# Patient Record
Sex: FEMALE | NOT HISPANIC OR LATINO | URBAN - NONMETROPOLITAN AREA
[De-identification: names, ages, dates, MRNs, and addresses within clinical notes are randomized per-mention and may not be internally consistent; named-entity substitution may affect disease eponyms.]

---

## 2023-10-06 ENCOUNTER — NURSING HOME VISIT (OUTPATIENT)
Dept: PRIMARY CARE | Facility: CLINIC | Age: 83
End: 2023-10-06
Payer: MEDICARE

## 2023-10-06 DIAGNOSIS — I10 PRIMARY HYPERTENSION: ICD-10-CM

## 2023-10-06 DIAGNOSIS — I25.10 CORONARY ARTERY DISEASE INVOLVING NATIVE CORONARY ARTERY OF NATIVE HEART WITHOUT ANGINA PECTORIS: Primary | ICD-10-CM

## 2023-10-06 DIAGNOSIS — K21.9 GASTROESOPHAGEAL REFLUX DISEASE WITHOUT ESOPHAGITIS: ICD-10-CM

## 2023-10-06 DIAGNOSIS — D69.6 THROMBOCYTOPENIA (CMS-HCC): ICD-10-CM

## 2023-10-06 PROCEDURE — 99308 SBSQ NF CARE LOW MDM 20: CPT | Performed by: FAMILY MEDICINE

## 2023-10-09 PROBLEM — F20.9 SCHIZOPHRENIA (MULTI): Status: ACTIVE | Noted: 2023-10-09

## 2023-10-09 PROBLEM — I10 HYPERTENSION: Status: ACTIVE | Noted: 2023-10-09

## 2023-10-09 PROBLEM — D69.6 THROMBOCYTOPENIA (CMS-HCC): Status: ACTIVE | Noted: 2023-10-09

## 2023-10-09 PROBLEM — I25.10 CORONARY ARTERY DISEASE: Status: ACTIVE | Noted: 2023-10-09

## 2023-10-09 PROBLEM — D72.829 LEUKOCYTOSIS: Status: ACTIVE | Noted: 2023-10-09

## 2023-10-09 PROBLEM — F03.90 DEMENTIA (MULTI): Status: ACTIVE | Noted: 2023-10-09

## 2023-10-09 PROBLEM — K21.9 GASTROESOPHAGEAL REFLUX DISEASE: Status: ACTIVE | Noted: 2023-10-09

## 2023-10-09 PROBLEM — E55.9 VITAMIN D DEFICIENCY: Status: ACTIVE | Noted: 2023-10-09

## 2023-10-09 PROBLEM — R13.10 DYSPHAGIA: Status: ACTIVE | Noted: 2023-10-09

## 2023-10-09 PROBLEM — K59.00 CONSTIPATION: Status: ACTIVE | Noted: 2023-10-09

## 2023-10-09 PROBLEM — L12.0 BULLOUS PEMPHIGOID (MULTI): Status: ACTIVE | Noted: 2023-10-09

## 2023-10-09 NOTE — PROGRESS NOTES
Subjective   Patient ID: Yamel Zacarias is a 83 y.o. female who is long term resident being seen and evaluated for multiple medical problems.    HPI see ltc note at facility     Review of Systems    Objective   There were no vitals taken for this visit.    Physical Exam    Assessment/Plan   Problem List Items Addressed This Visit             ICD-10-CM    Coronary artery disease - Primary I25.10    Gastroesophageal reflux disease K21.9    Hypertension I10    Thrombocytopenia (CMS/[facility]) D69.6        Goals    None

## 2023-11-10 ENCOUNTER — NURSING HOME VISIT (OUTPATIENT)
Dept: PRIMARY CARE | Facility: CLINIC | Age: 83
End: 2023-11-10
Payer: MEDICARE

## 2023-11-10 DIAGNOSIS — K21.9 GASTROESOPHAGEAL REFLUX DISEASE WITHOUT ESOPHAGITIS: ICD-10-CM

## 2023-11-10 DIAGNOSIS — I25.10 CORONARY ARTERY DISEASE INVOLVING NATIVE CORONARY ARTERY OF NATIVE HEART WITHOUT ANGINA PECTORIS: Primary | ICD-10-CM

## 2023-11-10 DIAGNOSIS — F03.B0 MODERATE DEMENTIA WITHOUT BEHAVIORAL DISTURBANCE, PSYCHOTIC DISTURBANCE, MOOD DISTURBANCE, OR ANXIETY, UNSPECIFIED DEMENTIA TYPE (MULTI): ICD-10-CM

## 2023-11-10 DIAGNOSIS — D69.6 THROMBOCYTOPENIA (CMS-HCC): ICD-10-CM

## 2023-11-10 DIAGNOSIS — D72.825 BANDEMIA: ICD-10-CM

## 2023-11-10 DIAGNOSIS — I10 PRIMARY HYPERTENSION: ICD-10-CM

## 2023-11-10 DIAGNOSIS — F20.9 SCHIZOPHRENIA, UNSPECIFIED TYPE (MULTI): ICD-10-CM

## 2023-11-10 PROCEDURE — 99309 SBSQ NF CARE MODERATE MDM 30: CPT | Performed by: FAMILY MEDICINE

## 2023-11-13 NOTE — PROGRESS NOTES
Subjective   Patient ID: Yamel Zacarias is a 83 y.o. female who presents for long-term care facility follow-up  HPI     Review of Systems    Objective   There were no vitals taken for this visit.    Physical Exam    Assessment/Plan   Problem List Items Addressed This Visit             ICD-10-CM    Coronary artery disease - Primary I25.10    Dementia (CMS/[facility]) F03.90    Gastroesophageal reflux disease K21.9    Hypertension I10    Leukocytosis D72.829    Schizophrenia (CMS/[facility]) F20.9    Thrombocytopenia (CMS/[facility]) D69.6        This note is for billing purposes only, please see notation at facility for comprehensive review.  Laboratory studies are ordered for next week.

## 2023-12-15 ENCOUNTER — NURSING HOME VISIT (OUTPATIENT)
Dept: POST ACUTE CARE | Facility: EXTERNAL LOCATION | Age: 83
End: 2023-12-15
Payer: COMMERCIAL

## 2023-12-15 DIAGNOSIS — D72.825 BANDEMIA: ICD-10-CM

## 2023-12-15 DIAGNOSIS — F03.B0 MODERATE DEMENTIA WITHOUT BEHAVIORAL DISTURBANCE, PSYCHOTIC DISTURBANCE, MOOD DISTURBANCE, OR ANXIETY, UNSPECIFIED DEMENTIA TYPE (MULTI): ICD-10-CM

## 2023-12-15 DIAGNOSIS — F20.9 SCHIZOPHRENIA, UNSPECIFIED TYPE (MULTI): ICD-10-CM

## 2023-12-15 DIAGNOSIS — I25.10 CORONARY ARTERY DISEASE INVOLVING NATIVE CORONARY ARTERY OF NATIVE HEART WITHOUT ANGINA PECTORIS: Primary | ICD-10-CM

## 2023-12-15 DIAGNOSIS — I10 PRIMARY HYPERTENSION: ICD-10-CM

## 2023-12-15 DIAGNOSIS — K59.01 SLOW TRANSIT CONSTIPATION: ICD-10-CM

## 2023-12-15 PROCEDURE — 99308 SBSQ NF CARE LOW MDM 20: CPT | Performed by: FAMILY MEDICINE

## 2023-12-15 NOTE — LETTER
Patient: Yamel Zacarias  : 1940    Encounter Date: 12/15/2023    Subjective  Patient ID: Yamel Zacarias is a 83 y.o. female who is long term resident being seen and evaluated for multiple medical problems.    HPI     Review of Systems    Objective  There were no vitals taken for this visit.    Physical Exam    Assessment/Plan  Problem List Items Addressed This Visit             ICD-10-CM    Constipation K59.00    Coronary artery disease - Primary I25.10    Dementia (CMS/[facility]) F03.90    Hypertension I10    Leukocytosis D72.829    Schizophrenia (CMS/[facility]) F20.9     Note is for billing purposes only please see complete documentation at [facility].   Goals    None           Electronically Signed By: Hyun Drake MD   23  2:13 PM

## 2023-12-18 NOTE — PROGRESS NOTES
Subjective   Patient ID: Yamel Zacarias is a 83 y.o. female who is long term resident being seen and evaluated for multiple medical problems.    HPI     Review of Systems    Objective   There were no vitals taken for this visit.    Physical Exam    Assessment/Plan   Problem List Items Addressed This Visit             ICD-10-CM    Constipation K59.00    Coronary artery disease - Primary I25.10    Dementia (CMS/[facility]) F03.90    Hypertension I10    Leukocytosis D72.829    Schizophrenia (CMS/[facility]) F20.9     Note is for billing purposes only please see complete documentation at [facility].   Goals    None

## 2024-01-12 ENCOUNTER — NURSING HOME VISIT (OUTPATIENT)
Dept: POST ACUTE CARE | Facility: EXTERNAL LOCATION | Age: 84
End: 2024-01-12
Payer: COMMERCIAL

## 2024-01-12 DIAGNOSIS — I25.10 CORONARY ARTERY DISEASE INVOLVING NATIVE CORONARY ARTERY OF NATIVE HEART WITHOUT ANGINA PECTORIS: Primary | ICD-10-CM

## 2024-01-12 DIAGNOSIS — D72.825 BANDEMIA: ICD-10-CM

## 2024-01-12 DIAGNOSIS — L12.0 BULLOUS PEMPHIGOID (MULTI): ICD-10-CM

## 2024-01-12 DIAGNOSIS — F03.B0 MODERATE DEMENTIA WITHOUT BEHAVIORAL DISTURBANCE, PSYCHOTIC DISTURBANCE, MOOD DISTURBANCE, OR ANXIETY, UNSPECIFIED DEMENTIA TYPE (MULTI): ICD-10-CM

## 2024-01-12 PROCEDURE — 99308 SBSQ NF CARE LOW MDM 20: CPT | Performed by: FAMILY MEDICINE

## 2024-01-12 NOTE — LETTER
Patient: Yamel Zacarias  : 1940    Encounter Date: 2024    Subjective  Patient ID: Yamel Zacarias is a 83 y.o. female who is long term resident being seen and evaluated for multiple medical problems.    HPI     Review of Systems    Objective  There were no vitals taken for this visit.    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  Please see facility documentation for complete note.   Goals    None           Electronically Signed By: Hyun Drake MD   1/15/24 10:13 AM

## 2024-01-15 NOTE — PROGRESS NOTES
Subjective   Patient ID: Yamel Zacarias is a 83 y.o. female who is long term resident being seen and evaluated for multiple medical problems.    HPI     Review of Systems    Objective   There were no vitals taken for this visit.    Physical Exam    Assessment/Plan   Problem List Items Addressed This Visit             ICD-10-CM    Bullous pemphigoid L12.0    Coronary artery disease - Primary I25.10    Dementia (CMS/[facility]) F03.90    Leukocytosis D72.829     This note is for billing purposes only.  Please see facility documentation for complete note.   Goals    None

## 2024-02-09 ENCOUNTER — NURSING HOME VISIT (OUTPATIENT)
Dept: POST ACUTE CARE | Facility: EXTERNAL LOCATION | Age: 84
End: 2024-02-09
Payer: COMMERCIAL

## 2024-02-09 DIAGNOSIS — K59.01 SLOW TRANSIT CONSTIPATION: Primary | ICD-10-CM

## 2024-02-09 DIAGNOSIS — D69.6 THROMBOCYTOPENIA (CMS-HCC): ICD-10-CM

## 2024-02-09 DIAGNOSIS — F20.9 SCHIZOPHRENIA, UNSPECIFIED TYPE (MULTI): ICD-10-CM

## 2024-02-09 PROCEDURE — 99308 SBSQ NF CARE LOW MDM 20: CPT | Performed by: FAMILY MEDICINE

## 2024-02-09 NOTE — LETTER
Patient: Yamel Zacarias  : 1940    Encounter Date: 2024    Subjective  Patient ID: Yamel Zacarias is a 83 y.o. female who is long term resident being seen and evaluated for multiple medical problems.    HPI     Review of Systems    Objective  There were no vitals taken for this visit.    Physical Exam    Assessment/Plan  Problem List Items Addressed This Visit             ICD-10-CM    Constipation - Primary K59.00    Schizophrenia (CMS/[facility]) F20.9    Thrombocytopenia (CMS/[facility]) D69.6   This note is for billing purposes only. For complete documentation, please see note at facility.      Goals    None           Electronically Signed By: Hyun Drake MD   24  1:59 PM

## 2024-02-12 NOTE — PROGRESS NOTES
Subjective   Patient ID: Yamel Zacarias is a 83 y.o. female who is long term resident being seen and evaluated for multiple medical problems.    HPI     Review of Systems    Objective   There were no vitals taken for this visit.    Physical Exam    Assessment/Plan   Problem List Items Addressed This Visit             ICD-10-CM    Constipation - Primary K59.00    Schizophrenia (CMS/[facility]) F20.9    Thrombocytopenia (CMS/[facility]) D69.6   This note is for billing purposes only. For complete documentation, please see note at facility.      Goals    None

## 2024-03-08 ENCOUNTER — NURSING HOME VISIT (OUTPATIENT)
Dept: POST ACUTE CARE | Facility: EXTERNAL LOCATION | Age: 84
End: 2024-03-08
Payer: MEDICARE

## 2024-03-08 DIAGNOSIS — F03.B0 MODERATE DEMENTIA WITHOUT BEHAVIORAL DISTURBANCE, PSYCHOTIC DISTURBANCE, MOOD DISTURBANCE, OR ANXIETY, UNSPECIFIED DEMENTIA TYPE (MULTI): ICD-10-CM

## 2024-03-08 DIAGNOSIS — I25.10 CORONARY ARTERY DISEASE INVOLVING NATIVE CORONARY ARTERY OF NATIVE HEART WITHOUT ANGINA PECTORIS: ICD-10-CM

## 2024-03-08 DIAGNOSIS — I10 PRIMARY HYPERTENSION: Primary | ICD-10-CM

## 2024-03-08 PROCEDURE — 99308 SBSQ NF CARE LOW MDM 20: CPT | Performed by: FAMILY MEDICINE

## 2024-03-08 NOTE — LETTER
Patient: Yamel Zacarias  : 1940    Encounter Date: 2024    Subjective  Patient ID: Yamel Zacarias is a 83 y.o. female who is long term resident being seen and evaluated for multiple medical problems.    HPI     Review of Systems    Objective  There were no vitals taken for this visit.    Physical Exam    Assessment/Plan  Problem List Items Addressed This Visit             ICD-10-CM    Coronary artery disease I25.10    Dementia (CMS/[facility]) F03.90    Hypertension - Primary I10     This note is being done for billing purposes only.  For complete documentation please see note at the facility.   Goals    None           Electronically Signed By: Hyun Drake MD   3/11/24  1:37 PM

## 2024-03-11 PROBLEM — F20.9 SCHIZOPHRENIA (MULTI): Status: RESOLVED | Noted: 2023-10-09 | Resolved: 2024-03-11

## 2024-03-11 NOTE — PROGRESS NOTES
Subjective   Patient ID: Yamel Zacarias is a 83 y.o. female who is long term resident being seen and evaluated for multiple medical problems.    HPI     Review of Systems    Objective   There were no vitals taken for this visit.    Physical Exam    Assessment/Plan   Problem List Items Addressed This Visit             ICD-10-CM    Coronary artery disease I25.10    Dementia (CMS/[facility]) F03.90    Hypertension - Primary I10     This note is being done for billing purposes only.  For complete documentation please see note at the facility.   Goals    None

## 2024-04-15 ENCOUNTER — NURSING HOME VISIT (OUTPATIENT)
Dept: POST ACUTE CARE | Facility: EXTERNAL LOCATION | Age: 84
End: 2024-04-15
Payer: MEDICARE

## 2024-04-15 DIAGNOSIS — K21.9 GASTROESOPHAGEAL REFLUX DISEASE WITHOUT ESOPHAGITIS: ICD-10-CM

## 2024-04-15 DIAGNOSIS — I10 PRIMARY HYPERTENSION: ICD-10-CM

## 2024-04-15 DIAGNOSIS — L12.0 BULLOUS PEMPHIGOID (MULTI): ICD-10-CM

## 2024-04-15 DIAGNOSIS — F03.B0 MODERATE DEMENTIA WITHOUT BEHAVIORAL DISTURBANCE, PSYCHOTIC DISTURBANCE, MOOD DISTURBANCE, OR ANXIETY, UNSPECIFIED DEMENTIA TYPE (MULTI): ICD-10-CM

## 2024-04-15 DIAGNOSIS — I25.10 CORONARY ARTERY DISEASE INVOLVING NATIVE CORONARY ARTERY OF NATIVE HEART WITHOUT ANGINA PECTORIS: Primary | ICD-10-CM

## 2024-04-15 PROCEDURE — 99309 SBSQ NF CARE MODERATE MDM 30: CPT | Performed by: FAMILY MEDICINE

## 2024-04-15 NOTE — LETTER
Patient: Yamel Zacarias  : 1940    Encounter Date: 04/15/2024    Subjective  Patient ID: Yamel Zacarias is a 83 y.o. female who is long term resident being seen and evaluated for multiple medical problems.    HPI     Review of Systems    Objective  There were no vitals taken for this visit.    Physical Exam    Assessment/Plan  Problem List Items Addressed This Visit             ICD-10-CM    Bullous pemphigoid (Multi) L12.0    Coronary artery disease - Primary I25.10    Dementia (Multi) F03.90    Gastroesophageal reflux disease K21.9    Hypertension I10   Please see note scanned under media for complete documentation.      Goals    None           Electronically Signed By: Hyun Drake MD   24  6:31 PM

## 2024-04-16 NOTE — PROGRESS NOTES
Subjective   Patient ID: Yamel Zacarias is a 83 y.o. female who is long term resident being seen and evaluated for multiple medical problems.    HPI     Review of Systems    Objective   There were no vitals taken for this visit.    Physical Exam    Assessment/Plan   Problem List Items Addressed This Visit             ICD-10-CM    Bullous pemphigoid (Multi) L12.0    Coronary artery disease - Primary I25.10    Dementia (Multi) F03.90    Gastroesophageal reflux disease K21.9    Hypertension I10   Please see note scanned under media for complete documentation.      Goals    None

## 2024-05-09 ENCOUNTER — NURSING HOME VISIT (OUTPATIENT)
Dept: POST ACUTE CARE | Facility: EXTERNAL LOCATION | Age: 84
End: 2024-05-09
Payer: COMMERCIAL

## 2024-05-09 VITALS
SYSTOLIC BLOOD PRESSURE: 130 MMHG | WEIGHT: 225.6 LBS | RESPIRATION RATE: 18 BRPM | OXYGEN SATURATION: 95 % | HEART RATE: 74 BPM | BODY MASS INDEX: 35.33 KG/M2 | TEMPERATURE: 97.9 F | DIASTOLIC BLOOD PRESSURE: 68 MMHG

## 2024-05-09 DIAGNOSIS — J18.9 PNEUMONIA OF BOTH LOWER LOBES DUE TO INFECTIOUS ORGANISM: Primary | ICD-10-CM

## 2024-05-09 PROCEDURE — 99308 SBSQ NF CARE LOW MDM 20: CPT | Performed by: NURSE PRACTITIONER

## 2024-05-09 RX ORDER — LOSARTAN POTASSIUM 50 MG/1
100 TABLET ORAL DAILY
COMMUNITY

## 2024-05-09 RX ORDER — CETIRIZINE HYDROCHLORIDE 5 MG/1
10 TABLET ORAL DAILY
COMMUNITY

## 2024-05-09 RX ORDER — METOPROLOL TARTRATE 100 MG/1
100 TABLET ORAL DAILY
COMMUNITY

## 2024-05-09 RX ORDER — GUAIFENESIN 600 MG/1
600 TABLET, EXTENDED RELEASE ORAL 2 TIMES DAILY
COMMUNITY

## 2024-05-09 RX ORDER — POTASSIUM CHLORIDE 750 MG/1
10 TABLET, FILM COATED, EXTENDED RELEASE ORAL DAILY
COMMUNITY

## 2024-05-09 RX ORDER — FOLIC ACID 1 MG/1
TABLET ORAL DAILY
COMMUNITY

## 2024-05-09 RX ORDER — AMOXICILLIN 250 MG
2 CAPSULE ORAL 2 TIMES DAILY
COMMUNITY

## 2024-05-09 RX ORDER — OMEPRAZOLE 20 MG/1
20 CAPSULE, DELAYED RELEASE ORAL
COMMUNITY

## 2024-05-09 RX ORDER — CALCIUM CARBONATE 300MG(750)
400 TABLET,CHEWABLE ORAL
COMMUNITY

## 2024-05-09 RX ORDER — PREDNISONE 10 MG/1
10 TABLET ORAL DAILY
COMMUNITY

## 2024-05-09 RX ORDER — FUROSEMIDE 20 MG/1
20 TABLET ORAL DAILY
COMMUNITY

## 2024-05-09 ASSESSMENT — PAIN SCALES - GENERAL: PAINLEVEL: 0-NO PAIN

## 2024-05-09 ASSESSMENT — ENCOUNTER SYMPTOMS
SHORTNESS OF BREATH: 0
COUGH: 1
FEVER: 0

## 2024-05-09 NOTE — PROGRESS NOTES
Subjective   Patient ID: Yamel Zacarias is a 83 y.o. female who presents for Pneumonia.    Following up with patient who is being treated for pneumonia with doxycycline. Her CXR showed bilateral perihilar infiltrates. She has been coughing and at times it is productive. She states that she is not SOB or having fever, chills or chest pain. She has had no issues with taking the antibiotic. She continues on mucinex and zyrtec.         Review of Systems   Constitutional:  Negative for fever.   Respiratory:  Positive for cough. Negative for shortness of breath.    Cardiovascular:  Negative for chest pain.   All other systems reviewed and are negative.      Objective   /68   Pulse 74   Temp 36.6 °C (97.9 °F)   Resp 18   Wt 102 kg (225 lb 9.6 oz)   SpO2 95%   BMI 35.33 kg/m²     Physical Exam  Constitutional:       General: She is not in acute distress.  HENT:      Head: Normocephalic.      Nose: Nose normal.      Mouth/Throat:      Mouth: Mucous membranes are moist.   Eyes:      Conjunctiva/sclera: Conjunctivae normal.   Cardiovascular:      Rate and Rhythm: Normal rate and regular rhythm.   Pulmonary:      Effort: Pulmonary effort is normal.      Breath sounds: Normal breath sounds.   Musculoskeletal:      Cervical back: Normal range of motion.      Comments: Propels self in WC   Skin:     General: Skin is warm and dry.   Neurological:      General: No focal deficit present.      Mental Status: She is alert.   Psychiatric:         Mood and Affect: Mood normal.         Assessment/Plan   Yamel was seen today for pneumonia.  Diagnoses and all orders for this visit:  Pneumonia of both lower lobes due to infectious organism (Primary)   Continue with the same antibiotics, mucinex and zyrtec, monitor labs for leukocytosis to trend to baseline

## 2024-05-09 NOTE — LETTER
Patient: Yamel Zacarias  : 1940    Encounter Date: 2024    Subjective  Patient ID: Yamel Zacarias is a 83 y.o. female who presents for Pneumonia.    Following up with patient who is being treated for pneumonia with doxycycline. Her CXR showed bilateral perihilar infiltrates. She has been coughing and at times it is productive. She states that she is not SOB or having fever, chills or chest pain. She has had no issues with taking the antibiotic. She continues on mucinex and zyrtec.         Review of Systems   Constitutional:  Negative for fever.   Respiratory:  Positive for cough. Negative for shortness of breath.    Cardiovascular:  Negative for chest pain.   All other systems reviewed and are negative.      Objective  /68   Pulse 74   Temp 36.6 °C (97.9 °F)   Resp 18   Wt 102 kg (225 lb 9.6 oz)   SpO2 95%   BMI 35.33 kg/m²     Physical Exam  Constitutional:       General: She is not in acute distress.  HENT:      Head: Normocephalic.      Nose: Nose normal.      Mouth/Throat:      Mouth: Mucous membranes are moist.   Eyes:      Conjunctiva/sclera: Conjunctivae normal.   Cardiovascular:      Rate and Rhythm: Normal rate and regular rhythm.   Pulmonary:      Effort: Pulmonary effort is normal.      Breath sounds: Normal breath sounds.   Musculoskeletal:      Cervical back: Normal range of motion.      Comments: Propels self in WC   Skin:     General: Skin is warm and dry.   Neurological:      General: No focal deficit present.      Mental Status: She is alert.   Psychiatric:         Mood and Affect: Mood normal.         Assessment/Plan  Yamel was seen today for pneumonia.  Diagnoses and all orders for this visit:  Pneumonia of both lower lobes due to infectious organism (Primary)   Continue with the same antibiotics, mucinex and zyrtec, monitor labs for leukocytosis to trend to baseline           Electronically Signed By: SKY Boyd-CNP   24 11:34 AM

## 2024-05-13 ENCOUNTER — NURSING HOME VISIT (OUTPATIENT)
Dept: POST ACUTE CARE | Facility: EXTERNAL LOCATION | Age: 84
End: 2024-05-13
Payer: MEDICARE

## 2024-05-13 DIAGNOSIS — F03.B0 MODERATE DEMENTIA WITHOUT BEHAVIORAL DISTURBANCE, PSYCHOTIC DISTURBANCE, MOOD DISTURBANCE, OR ANXIETY, UNSPECIFIED DEMENTIA TYPE (MULTI): ICD-10-CM

## 2024-05-13 DIAGNOSIS — L12.0 BULLOUS PEMPHIGOID (MULTI): ICD-10-CM

## 2024-05-13 DIAGNOSIS — I10 PRIMARY HYPERTENSION: ICD-10-CM

## 2024-05-13 DIAGNOSIS — I25.10 CORONARY ARTERY DISEASE INVOLVING NATIVE CORONARY ARTERY OF NATIVE HEART WITHOUT ANGINA PECTORIS: ICD-10-CM

## 2024-05-13 DIAGNOSIS — D72.825 BANDEMIA: Primary | ICD-10-CM

## 2024-05-13 PROCEDURE — 99309 SBSQ NF CARE MODERATE MDM 30: CPT | Performed by: FAMILY MEDICINE

## 2024-05-13 NOTE — LETTER
Patient: Yamel Zacarias  : 1940    Encounter Date: 2024    Subjective  Patient ID: Yamel Zacarias is a 83 y.o. female who is long term resident being seen and evaluated for multiple medical problems.    HPI     Review of Systems    Objective  There were no vitals taken for this visit.    Physical Exam    Assessment/Plan  Problem List Items Addressed This Visit             ICD-10-CM    Bullous pemphigoid (Multi) L12.0    Coronary artery disease I25.10    Dementia (Multi) F03.90    Hypertension I10    Leukocytosis - Primary D72.829       For complete documentation please see note scanned under media   Goals    None           Electronically Signed By: Hyun Drake MD   24  9:17 AM

## 2024-05-14 NOTE — PROGRESS NOTES
Subjective   Patient ID: Yamel Zacarias is a 83 y.o. female who is long term resident being seen and evaluated for multiple medical problems.    HPI     Review of Systems    Objective   There were no vitals taken for this visit.    Physical Exam    Assessment/Plan   Problem List Items Addressed This Visit             ICD-10-CM    Bullous pemphigoid (Multi) L12.0    Coronary artery disease I25.10    Dementia (Multi) F03.90    Hypertension I10    Leukocytosis - Primary D72.829       For complete documentation please see note scanned under media   Goals    None

## 2024-06-10 ENCOUNTER — NURSING HOME VISIT (OUTPATIENT)
Dept: POST ACUTE CARE | Facility: EXTERNAL LOCATION | Age: 84
End: 2024-06-10
Payer: COMMERCIAL

## 2024-06-10 DIAGNOSIS — K59.01 SLOW TRANSIT CONSTIPATION: Primary | ICD-10-CM

## 2024-06-10 DIAGNOSIS — I25.10 CORONARY ARTERY DISEASE INVOLVING NATIVE CORONARY ARTERY OF NATIVE HEART WITHOUT ANGINA PECTORIS: ICD-10-CM

## 2024-06-10 DIAGNOSIS — I10 PRIMARY HYPERTENSION: ICD-10-CM

## 2024-06-10 PROCEDURE — 99309 SBSQ NF CARE MODERATE MDM 30: CPT | Performed by: FAMILY MEDICINE

## 2024-06-10 NOTE — LETTER
Patient: Yamel Zacarias  : 1940    Encounter Date: 06/10/2024    Subjective  Patient ID: Yamel Zacarias is a 83 y.o. female who is long term resident being seen and evaluated for multiple medical problems.    HPI patient being seen for monthly visit.  She was recently treated for pneumonia and since has had improvement in her cough.  No status back to baseline.  Did have laboratory studies done earlier in May that showed a higher white count but this was in conjunction with her infection.  She has had some worsening constipation symptoms as well.  No ongoing cough or shortness of breath fever sweats chills or chest pain noted.    Review of Systems   Constitutional:  Negative for chills, fatigue and fever.   HENT:  Negative for congestion and sore throat.    Eyes:  Negative for visual disturbance.   Respiratory:  Negative for cough and shortness of breath.    Cardiovascular:  Negative for chest pain.   Gastrointestinal:  Positive for constipation. Negative for abdominal pain, diarrhea, nausea and vomiting.   Genitourinary:  Negative for dysuria.   Neurological:  Negative for headaches.       Objective  There were no vitals taken for this visit.    Physical Exam  Constitutional:       General: She is not in acute distress.     Appearance: Normal appearance.   HENT:      Head: Normocephalic.      Mouth/Throat:      Mouth: Mucous membranes are moist.      Pharynx: Oropharynx is clear.   Eyes:      Extraocular Movements: Extraocular movements intact.      Pupils: Pupils are equal, round, and reactive to light.   Cardiovascular:      Rate and Rhythm: Normal rate and regular rhythm.      Heart sounds: Normal heart sounds.   Pulmonary:      Effort: Pulmonary effort is normal.      Breath sounds: Normal breath sounds. No wheezing or rhonchi.   Abdominal:      General: There is distension.      Palpations: Abdomen is soft.      Tenderness: There is no abdominal tenderness. There is no guarding or rebound.    Musculoskeletal:      Right lower leg: No edema.      Left lower leg: No edema.   Lymphadenopathy:      Cervical: No cervical adenopathy.   Skin:     Coloration: Skin is not jaundiced.   Neurological:      Mental Status: She is alert. Mental status is at baseline.      Cranial Nerves: No cranial nerve deficit.      Gait: Gait abnormal.   Psychiatric:         Mood and Affect: Mood normal.         Assessment/Plan  Problem List Items Addressed This Visit             ICD-10-CM    Constipation - Primary K59.00    Coronary artery disease I25.10    Hypertension I10   Add Colace twice a day, recheck laboratory studies to make sure white count has come back down into her baseline range which is usually much more mildly elevated     Goals    None           Electronically Signed By: Hyun Drake MD   6/11/24 11:02 AM

## 2024-06-11 ASSESSMENT — ENCOUNTER SYMPTOMS
DIARRHEA: 0
SORE THROAT: 0
HEADACHES: 0
COUGH: 0
VOMITING: 0
CHILLS: 0
DYSURIA: 0
SHORTNESS OF BREATH: 0
NAUSEA: 0
ABDOMINAL PAIN: 0
FATIGUE: 0
CONSTIPATION: 1
FEVER: 0

## 2024-06-11 NOTE — PROGRESS NOTES
Subjective   Patient ID: Yamel Zacarias is a 83 y.o. female who is long term resident being seen and evaluated for multiple medical problems.    HPI patient being seen for monthly visit.  She was recently treated for pneumonia and since has had improvement in her cough.  No status back to baseline.  Did have laboratory studies done earlier in May that showed a higher white count but this was in conjunction with her infection.  She has had some worsening constipation symptoms as well.  No ongoing cough or shortness of breath fever sweats chills or chest pain noted.    Review of Systems   Constitutional:  Negative for chills, fatigue and fever.   HENT:  Negative for congestion and sore throat.    Eyes:  Negative for visual disturbance.   Respiratory:  Negative for cough and shortness of breath.    Cardiovascular:  Negative for chest pain.   Gastrointestinal:  Positive for constipation. Negative for abdominal pain, diarrhea, nausea and vomiting.   Genitourinary:  Negative for dysuria.   Neurological:  Negative for headaches.       Objective   There were no vitals taken for this visit.    Physical Exam  Constitutional:       General: She is not in acute distress.     Appearance: Normal appearance.   HENT:      Head: Normocephalic.      Mouth/Throat:      Mouth: Mucous membranes are moist.      Pharynx: Oropharynx is clear.   Eyes:      Extraocular Movements: Extraocular movements intact.      Pupils: Pupils are equal, round, and reactive to light.   Cardiovascular:      Rate and Rhythm: Normal rate and regular rhythm.      Heart sounds: Normal heart sounds.   Pulmonary:      Effort: Pulmonary effort is normal.      Breath sounds: Normal breath sounds. No wheezing or rhonchi.   Abdominal:      General: There is distension.      Palpations: Abdomen is soft.      Tenderness: There is no abdominal tenderness. There is no guarding or rebound.   Musculoskeletal:      Right lower leg: No edema.      Left lower leg: No  edema.   Lymphadenopathy:      Cervical: No cervical adenopathy.   Skin:     Coloration: Skin is not jaundiced.   Neurological:      Mental Status: She is alert. Mental status is at baseline.      Cranial Nerves: No cranial nerve deficit.      Gait: Gait abnormal.   Psychiatric:         Mood and Affect: Mood normal.         Assessment/Plan   Problem List Items Addressed This Visit             ICD-10-CM    Constipation - Primary K59.00    Coronary artery disease I25.10    Hypertension I10   Add Colace twice a day, recheck laboratory studies to make sure white count has come back down into her baseline range which is usually much more mildly elevated     Goals    None

## 2024-07-08 ENCOUNTER — NURSING HOME VISIT (OUTPATIENT)
Dept: POST ACUTE CARE | Facility: EXTERNAL LOCATION | Age: 84
End: 2024-07-08
Payer: COMMERCIAL

## 2024-07-08 DIAGNOSIS — K59.01 SLOW TRANSIT CONSTIPATION: ICD-10-CM

## 2024-07-08 DIAGNOSIS — I10 PRIMARY HYPERTENSION: Primary | ICD-10-CM

## 2024-07-08 DIAGNOSIS — I25.10 CORONARY ARTERY DISEASE INVOLVING NATIVE CORONARY ARTERY OF NATIVE HEART WITHOUT ANGINA PECTORIS: ICD-10-CM

## 2024-07-08 PROCEDURE — 99308 SBSQ NF CARE LOW MDM 20: CPT | Performed by: FAMILY MEDICINE

## 2024-07-08 NOTE — LETTER
Patient: Yamel Zacarias  : 1940    Encounter Date: 2024    Subjective  Patient ID: Yamel Zacarias is a 83 y.o. female who is long term resident being seen and evaluated for multiple medical problems.    HPI patient being seen for monthly visit.  She was recently treated for pneumonia in may and is now back to baseline with no further cough noted. She self propels throughout the facility. Labs done in may with rechecks due in September.       Review of Systems   Constitutional:  Negative for chills, fatigue and fever.   HENT:  Negative for congestion and sore throat.    Eyes:  Negative for visual disturbance.   Respiratory:  Negative for cough and shortness of breath.    Cardiovascular:  Negative for chest pain.   Gastrointestinal:  Negative for abdominal pain, constipation, diarrhea, nausea and vomiting.   Genitourinary:  Negative for dysuria.   Musculoskeletal:  Positive for arthralgias and gait problem.   Neurological:  Negative for headaches.   Psychiatric/Behavioral:  Positive for dysphoric mood.        Objective  There were no vitals taken for this visit.    Physical Exam  Vitals reviewed: 136/72 98 76 18 wt 223.   Constitutional:       General: She is not in acute distress.     Appearance: Normal appearance.   HENT:      Head: Normocephalic.      Mouth/Throat:      Mouth: Mucous membranes are moist.      Pharynx: Oropharynx is clear.   Eyes:      Extraocular Movements: Extraocular movements intact.      Pupils: Pupils are equal, round, and reactive to light.   Cardiovascular:      Rate and Rhythm: Normal rate and regular rhythm.      Heart sounds: Normal heart sounds.   Pulmonary:      Effort: Pulmonary effort is normal.      Breath sounds: Normal breath sounds. No wheezing or rhonchi.   Abdominal:      General: There is no distension.      Palpations: Abdomen is soft.      Tenderness: There is no abdominal tenderness. There is no guarding or rebound.   Musculoskeletal:      Right lower leg:  No edema.      Left lower leg: No edema.   Lymphadenopathy:      Cervical: No cervical adenopathy.   Skin:     Coloration: Skin is not jaundiced.   Neurological:      Mental Status: She is alert. Mental status is at baseline.      Cranial Nerves: No cranial nerve deficit.      Gait: Gait abnormal.   Psychiatric:         Mood and Affect: Mood normal.         Assessment/Plan  Problem List Items Addressed This Visit             ICD-10-CM    Constipation K59.00    Coronary artery disease I25.10    Hypertension - Primary I10   Bowels better, labs due sept     Goals    None           Electronically Signed By: Hyun Drake MD   7/9/24 12:49 PM

## 2024-07-09 ASSESSMENT — ENCOUNTER SYMPTOMS
HEADACHES: 0
SORE THROAT: 0
DIARRHEA: 0
FEVER: 0
DYSPHORIC MOOD: 1
VOMITING: 0
ARTHRALGIAS: 1
DYSURIA: 0
NAUSEA: 0
SHORTNESS OF BREATH: 0
FATIGUE: 0
CONSTIPATION: 0
ABDOMINAL PAIN: 0
COUGH: 0
CHILLS: 0

## 2024-07-16 ENCOUNTER — NURSING HOME VISIT (OUTPATIENT)
Dept: POST ACUTE CARE | Facility: EXTERNAL LOCATION | Age: 84
End: 2024-07-16
Payer: MEDICARE

## 2024-07-16 VITALS
RESPIRATION RATE: 18 BRPM | HEART RATE: 76 BPM | BODY MASS INDEX: 35.05 KG/M2 | DIASTOLIC BLOOD PRESSURE: 72 MMHG | WEIGHT: 223.8 LBS | SYSTOLIC BLOOD PRESSURE: 136 MMHG | OXYGEN SATURATION: 97 % | TEMPERATURE: 97.6 F

## 2024-07-16 DIAGNOSIS — K59.01 SLOW TRANSIT CONSTIPATION: ICD-10-CM

## 2024-07-16 DIAGNOSIS — I10 PRIMARY HYPERTENSION: Primary | ICD-10-CM

## 2024-07-16 DIAGNOSIS — R13.12 OROPHARYNGEAL DYSPHAGIA: ICD-10-CM

## 2024-07-16 PROCEDURE — 99309 SBSQ NF CARE MODERATE MDM 30: CPT | Performed by: NURSE PRACTITIONER

## 2024-07-16 ASSESSMENT — ENCOUNTER SYMPTOMS
FEVER: 0
DEPRESSION: 0
SHORTNESS OF BREATH: 0
DIARRHEA: 0
OCCASIONAL FEELINGS OF UNSTEADINESS: 1
FATIGUE: 0
CONSTIPATION: 0
LOSS OF SENSATION IN FEET: 0

## 2024-07-16 NOTE — PROGRESS NOTES
Subjective   Patient ID: Yamel Zacarias is a 83 y.o. female who presents for Hypertension (Dysphagia, constipation).    Following up with patient who takes losartan, lasix and metoprolol for management of HTN. She denies dizziness, lightheadedness, chest pain or SOB. Her electrolytes were checked in May 2024 and WNL. Her lipid panel was stable She takes metamucil and colace for management of constipation. She has daily BM's. Denies abdominal discomfort. She remains on a dysphagia diet and thickened liquids. She takes guaifenesin daily to help with mucous congestion         Review of Systems   Constitutional:  Negative for fatigue and fever.   Respiratory:  Negative for shortness of breath.    Cardiovascular:  Negative for chest pain.   Gastrointestinal:  Negative for constipation and diarrhea.       Objective   /72   Pulse 76   Temp 36.4 °C (97.6 °F)   Resp 18   Wt 102 kg (223 lb 12.8 oz)   SpO2 97%   BMI 35.05 kg/m²     Physical Exam  Constitutional:       Appearance: She is obese.   HENT:      Mouth/Throat:      Mouth: Mucous membranes are moist.      Pharynx: Oropharynx is clear.   Eyes:      Conjunctiva/sclera: Conjunctivae normal.   Cardiovascular:      Rate and Rhythm: Normal rate and regular rhythm.   Pulmonary:      Effort: Pulmonary effort is normal.      Breath sounds: Normal breath sounds.   Abdominal:      General: Bowel sounds are normal.      Palpations: Abdomen is soft.   Musculoskeletal:      Comments: Jermaine lift for transfers, propels self in WC   Skin:     General: Skin is warm.   Neurological:      Mental Status: She is alert.   Psychiatric:         Mood and Affect: Mood normal.         Assessment/Plan   Diagnoses and all orders for this visit:  Primary hypertension  Comments:  chronic/stable: cont with losartan, metoprolol, lasix, monitor labs q4-6 months  Slow transit constipation  Comments:  chronic/stable: cont with metamucil, colace  Oropharyngeal  dysphagia  Comments:  chronic/stable: cont with current diet, monitor for worsened symptoms, ST as indicated

## 2024-07-16 NOTE — LETTER
Patient: Yamel Zacarias  : 1940    Encounter Date: 2024    Subjective  Patient ID: Yamel Zacarias is a 83 y.o. female who presents for Hypertension (Dysphagia, constipation).    Following up with patient who takes losartan, lasix and metoprolol for management of HTN. She denies dizziness, lightheadedness, chest pain or SOB. Her electrolytes were checked in May 2024 and WNL. Her lipid panel was stable She takes metamucil and colace for management of constipation. She has daily BM's. Denies abdominal discomfort. She remains on a dysphagia diet and thickened liquids. She takes guaifenesin daily to help with mucous congestion         Review of Systems   Constitutional:  Negative for fatigue and fever.   Respiratory:  Negative for shortness of breath.    Cardiovascular:  Negative for chest pain.   Gastrointestinal:  Negative for constipation and diarrhea.       Objective  /72   Pulse 76   Temp 36.4 °C (97.6 °F)   Resp 18   Wt 102 kg (223 lb 12.8 oz)   SpO2 97%   BMI 35.05 kg/m²     Physical Exam  Constitutional:       Appearance: She is obese.   HENT:      Mouth/Throat:      Mouth: Mucous membranes are moist.      Pharynx: Oropharynx is clear.   Eyes:      Conjunctiva/sclera: Conjunctivae normal.   Cardiovascular:      Rate and Rhythm: Normal rate and regular rhythm.   Pulmonary:      Effort: Pulmonary effort is normal.      Breath sounds: Normal breath sounds.   Abdominal:      General: Bowel sounds are normal.      Palpations: Abdomen is soft.   Musculoskeletal:      Comments: Jermaine lift for transfers, propels self in WC   Skin:     General: Skin is warm.   Neurological:      Mental Status: She is alert.   Psychiatric:         Mood and Affect: Mood normal.         Assessment/Plan  Diagnoses and all orders for this visit:  Primary hypertension  Comments:  chronic/stable: cont with losartan, metoprolol, lasix, monitor labs q4-6 months  Slow transit constipation  Comments:  chronic/stable:  cont with metamucil, colace  Oropharyngeal dysphagia  Comments:  chronic/stable: cont with current diet, monitor for worsened symptoms, ST as indicated           Electronically Signed By: MELECIO Boyd   7/16/24  4:14 PM

## 2024-08-05 ENCOUNTER — NURSING HOME VISIT (OUTPATIENT)
Dept: POST ACUTE CARE | Facility: EXTERNAL LOCATION | Age: 84
End: 2024-08-05
Payer: COMMERCIAL

## 2024-08-05 DIAGNOSIS — D72.825 BANDEMIA: ICD-10-CM

## 2024-08-05 DIAGNOSIS — L12.0 BULLOUS PEMPHIGOID (MULTI): ICD-10-CM

## 2024-08-05 DIAGNOSIS — D69.6 THROMBOCYTOPENIA (CMS-HCC): ICD-10-CM

## 2024-08-05 DIAGNOSIS — I25.10 CORONARY ARTERY DISEASE INVOLVING NATIVE CORONARY ARTERY OF NATIVE HEART WITHOUT ANGINA PECTORIS: Primary | ICD-10-CM

## 2024-08-05 DIAGNOSIS — I10 PRIMARY HYPERTENSION: ICD-10-CM

## 2024-08-05 PROCEDURE — 99309 SBSQ NF CARE MODERATE MDM 30: CPT | Performed by: FAMILY MEDICINE

## 2024-08-05 NOTE — LETTER
Patient: Yamel Zacarias  : 1940    Encounter Date: 2024    Subjective  Patient ID: Yamel Zacarias is a 83 y.o. female who is long term resident being seen and evaluated for multiple medical problems.    HPI patient being seen for monthly visit.  She is due for labs in September and we will get those ordered today. No further cough or resp sx noted. She is back to her baseline. Staff has no new concerns.     Review of Systems   Constitutional:  Negative for chills, fatigue and fever.   HENT:  Negative for congestion and sore throat.    Eyes:  Negative for visual disturbance.   Respiratory:  Negative for cough and shortness of breath.    Cardiovascular:  Negative for chest pain.   Gastrointestinal:  Negative for abdominal pain, constipation, diarrhea, nausea and vomiting.   Genitourinary:  Negative for dysuria.   Musculoskeletal:  Positive for arthralgias and gait problem.   Neurological:  Negative for headaches.   Psychiatric/Behavioral:  Positive for dysphoric mood.        Objective  There were no vitals taken for this visit.    Physical Exam  Vitals reviewed: 138/80 98 62 18 wt 208-down 15 lbs.   Constitutional:       General: She is not in acute distress.     Appearance: Normal appearance.   HENT:      Head: Normocephalic.      Mouth/Throat:      Mouth: Mucous membranes are moist.      Pharynx: Oropharynx is clear.   Eyes:      Extraocular Movements: Extraocular movements intact.      Pupils: Pupils are equal, round, and reactive to light.   Cardiovascular:      Rate and Rhythm: Normal rate and regular rhythm.      Heart sounds: Normal heart sounds.   Pulmonary:      Effort: Pulmonary effort is normal.      Breath sounds: Normal breath sounds. No wheezing or rhonchi.   Abdominal:      General: There is no distension.      Palpations: Abdomen is soft.      Tenderness: There is no abdominal tenderness. There is no guarding or rebound.   Musculoskeletal:      Right lower leg: No edema.      Left  lower leg: No edema.   Lymphadenopathy:      Cervical: No cervical adenopathy.   Skin:     Coloration: Skin is not jaundiced.   Neurological:      Mental Status: She is alert. Mental status is at baseline.      Cranial Nerves: No cranial nerve deficit.      Gait: Gait abnormal.   Psychiatric:         Mood and Affect: Mood normal.         Assessment/Plan  Problem List Items Addressed This Visit             ICD-10-CM    Bullous pemphigoid (Multi) L12.0    Coronary artery disease - Primary I25.10    Hypertension I10    Leukocytosis D72.829    Thrombocytopenia (CMS-HCC) D69.6     cbc cmp ldh due in september alone with lipids     Goals    None           Electronically Signed By: Hyun Drake MD   8/7/24  3:37 PM

## 2024-08-07 ASSESSMENT — ENCOUNTER SYMPTOMS
COUGH: 0
NAUSEA: 0
ARTHRALGIAS: 1
FATIGUE: 0
FEVER: 0
CHILLS: 0
DIARRHEA: 0
SHORTNESS OF BREATH: 0
VOMITING: 0
SORE THROAT: 0
DYSPHORIC MOOD: 1
ABDOMINAL PAIN: 0
CONSTIPATION: 0
DYSURIA: 0
HEADACHES: 0

## 2024-08-07 NOTE — PROGRESS NOTES
Subjective   Patient ID: Yamel Zacarias is a 83 y.o. female who is long term resident being seen and evaluated for multiple medical problems.    HPI patient being seen for monthly visit.  She is due for labs in September and we will get those ordered today. No further cough or resp sx noted. She is back to her baseline. Staff has no new concerns.     Review of Systems   Constitutional:  Negative for chills, fatigue and fever.   HENT:  Negative for congestion and sore throat.    Eyes:  Negative for visual disturbance.   Respiratory:  Negative for cough and shortness of breath.    Cardiovascular:  Negative for chest pain.   Gastrointestinal:  Negative for abdominal pain, constipation, diarrhea, nausea and vomiting.   Genitourinary:  Negative for dysuria.   Musculoskeletal:  Positive for arthralgias and gait problem.   Neurological:  Negative for headaches.   Psychiatric/Behavioral:  Positive for dysphoric mood.        Objective   There were no vitals taken for this visit.    Physical Exam  Vitals reviewed: 138/80 98 62 18 wt 208-down 15 lbs.   Constitutional:       General: She is not in acute distress.     Appearance: Normal appearance.   HENT:      Head: Normocephalic.      Mouth/Throat:      Mouth: Mucous membranes are moist.      Pharynx: Oropharynx is clear.   Eyes:      Extraocular Movements: Extraocular movements intact.      Pupils: Pupils are equal, round, and reactive to light.   Cardiovascular:      Rate and Rhythm: Normal rate and regular rhythm.      Heart sounds: Normal heart sounds.   Pulmonary:      Effort: Pulmonary effort is normal.      Breath sounds: Normal breath sounds. No wheezing or rhonchi.   Abdominal:      General: There is no distension.      Palpations: Abdomen is soft.      Tenderness: There is no abdominal tenderness. There is no guarding or rebound.   Musculoskeletal:      Right lower leg: No edema.      Left lower leg: No edema.   Lymphadenopathy:      Cervical: No cervical  adenopathy.   Skin:     Coloration: Skin is not jaundiced.   Neurological:      Mental Status: She is alert. Mental status is at baseline.      Cranial Nerves: No cranial nerve deficit.      Gait: Gait abnormal.   Psychiatric:         Mood and Affect: Mood normal.         Assessment/Plan   Problem List Items Addressed This Visit             ICD-10-CM    Bullous pemphigoid (Multi) L12.0    Coronary artery disease - Primary I25.10    Hypertension I10    Leukocytosis D72.829    Thrombocytopenia (CMS-HCC) D69.6     cbc cmp ldh due in september alone with lipids     Goals    None

## 2024-09-06 ENCOUNTER — NURSING HOME VISIT (OUTPATIENT)
Dept: POST ACUTE CARE | Facility: EXTERNAL LOCATION | Age: 84
End: 2024-09-06
Payer: COMMERCIAL

## 2024-09-06 DIAGNOSIS — I10 PRIMARY HYPERTENSION: Primary | ICD-10-CM

## 2024-09-06 DIAGNOSIS — F03.B0 MODERATE DEMENTIA WITHOUT BEHAVIORAL DISTURBANCE, PSYCHOTIC DISTURBANCE, MOOD DISTURBANCE, OR ANXIETY, UNSPECIFIED DEMENTIA TYPE (MULTI): ICD-10-CM

## 2024-09-06 DIAGNOSIS — L12.0 BULLOUS PEMPHIGOID (MULTI): ICD-10-CM

## 2024-09-06 DIAGNOSIS — D69.6 THROMBOCYTOPENIA (CMS-HCC): ICD-10-CM

## 2024-09-06 DIAGNOSIS — D72.825 BANDEMIA: ICD-10-CM

## 2024-09-06 PROCEDURE — 99309 SBSQ NF CARE MODERATE MDM 30: CPT | Performed by: FAMILY MEDICINE

## 2024-09-06 NOTE — LETTER
Patient: Yamel Zacarias  : 1940    Encounter Date: 2024    Subjective  Patient ID: Yamel Zacarias is a 83 y.o. female who is long term resident being seen and evaluated for multiple medical problems.    HPI patient being seen for monthly visit.  She had labs this month significant for Na 148, wbc 13, hgb 13. She has no new complaints or concerns. Staff has no new concerns.     Review of Systems   Constitutional:  Negative for chills, fatigue and fever.   HENT:  Negative for congestion and sore throat.    Eyes:  Negative for visual disturbance.   Respiratory:  Negative for cough and shortness of breath.    Cardiovascular:  Negative for chest pain.   Gastrointestinal:  Negative for abdominal pain, constipation, diarrhea, nausea and vomiting.   Genitourinary:  Negative for dysuria.   Musculoskeletal:  Positive for arthralgias and gait problem.   Neurological:  Negative for headaches.   Psychiatric/Behavioral:  Positive for dysphoric mood.        Objective  There were no vitals taken for this visit.    Physical Exam  Vitals reviewed: 130/50 97 61 16 wt 208.   Constitutional:       General: She is not in acute distress.     Appearance: Normal appearance.   HENT:      Head: Normocephalic.      Mouth/Throat:      Mouth: Mucous membranes are moist.      Pharynx: Oropharynx is clear.   Eyes:      Extraocular Movements: Extraocular movements intact.      Pupils: Pupils are equal, round, and reactive to light.   Cardiovascular:      Rate and Rhythm: Normal rate and regular rhythm.      Heart sounds: Normal heart sounds.   Pulmonary:      Effort: Pulmonary effort is normal.      Breath sounds: Normal breath sounds. No wheezing or rhonchi.   Abdominal:      General: There is no distension.      Palpations: Abdomen is soft.      Tenderness: There is no abdominal tenderness. There is no guarding or rebound.   Musculoskeletal:      Right lower leg: No edema.      Left lower leg: No edema.   Lymphadenopathy:       Cervical: No cervical adenopathy.   Skin:     Coloration: Skin is not jaundiced.   Neurological:      Mental Status: She is alert. Mental status is at baseline.      Cranial Nerves: No cranial nerve deficit.      Gait: Gait abnormal.   Psychiatric:         Mood and Affect: Mood normal.         Assessment/Plan  Problem List Items Addressed This Visit             ICD-10-CM    Bullous pemphigoid (Multi) L12.0    Dementia (Multi) F03.90    Hypertension - Primary I10    Leukocytosis D72.829    Thrombocytopenia (CMS-HCC) D69.6   Plt now normal  Hgb stable along with wbc with chronic pred use  Monitor sodium levels  Lipids still pending     Goals    None           Electronically Signed By: Hyun Drake MD   9/9/24  2:16 PM

## 2024-09-09 ASSESSMENT — ENCOUNTER SYMPTOMS
DYSPHORIC MOOD: 1
NAUSEA: 0
FATIGUE: 0
HEADACHES: 0
DIARRHEA: 0
ARTHRALGIAS: 1
ABDOMINAL PAIN: 0
VOMITING: 0
SORE THROAT: 0
DYSURIA: 0
FEVER: 0
CONSTIPATION: 0
CHILLS: 0
COUGH: 0
SHORTNESS OF BREATH: 0

## 2024-09-09 NOTE — PROGRESS NOTES
Subjective   Patient ID: Yamel Zacarias is a 83 y.o. female who is long term resident being seen and evaluated for multiple medical problems.    HPI patient being seen for monthly visit.  She had labs this month significant for Na 148, wbc 13, hgb 13. She has no new complaints or concerns. Staff has no new concerns.     Review of Systems   Constitutional:  Negative for chills, fatigue and fever.   HENT:  Negative for congestion and sore throat.    Eyes:  Negative for visual disturbance.   Respiratory:  Negative for cough and shortness of breath.    Cardiovascular:  Negative for chest pain.   Gastrointestinal:  Negative for abdominal pain, constipation, diarrhea, nausea and vomiting.   Genitourinary:  Negative for dysuria.   Musculoskeletal:  Positive for arthralgias and gait problem.   Neurological:  Negative for headaches.   Psychiatric/Behavioral:  Positive for dysphoric mood.        Objective   There were no vitals taken for this visit.    Physical Exam  Vitals reviewed: 130/50 97 61 16 wt 208.   Constitutional:       General: She is not in acute distress.     Appearance: Normal appearance.   HENT:      Head: Normocephalic.      Mouth/Throat:      Mouth: Mucous membranes are moist.      Pharynx: Oropharynx is clear.   Eyes:      Extraocular Movements: Extraocular movements intact.      Pupils: Pupils are equal, round, and reactive to light.   Cardiovascular:      Rate and Rhythm: Normal rate and regular rhythm.      Heart sounds: Normal heart sounds.   Pulmonary:      Effort: Pulmonary effort is normal.      Breath sounds: Normal breath sounds. No wheezing or rhonchi.   Abdominal:      General: There is no distension.      Palpations: Abdomen is soft.      Tenderness: There is no abdominal tenderness. There is no guarding or rebound.   Musculoskeletal:      Right lower leg: No edema.      Left lower leg: No edema.   Lymphadenopathy:      Cervical: No cervical adenopathy.   Skin:     Coloration: Skin is not  jaundiced.   Neurological:      Mental Status: She is alert. Mental status is at baseline.      Cranial Nerves: No cranial nerve deficit.      Gait: Gait abnormal.   Psychiatric:         Mood and Affect: Mood normal.         Assessment/Plan   Problem List Items Addressed This Visit             ICD-10-CM    Bullous pemphigoid (Multi) L12.0    Dementia (Multi) F03.90    Hypertension - Primary I10    Leukocytosis D72.829    Thrombocytopenia (CMS-HCC) D69.6   Plt now normal  Hgb stable along with wbc with chronic pred use  Monitor sodium levels  Lipids still pending     Goals    None

## 2024-10-07 ENCOUNTER — NURSING HOME VISIT (OUTPATIENT)
Dept: POST ACUTE CARE | Facility: EXTERNAL LOCATION | Age: 84
End: 2024-10-07
Payer: COMMERCIAL

## 2024-10-07 DIAGNOSIS — I10 PRIMARY HYPERTENSION: ICD-10-CM

## 2024-10-07 DIAGNOSIS — D72.825 BANDEMIA: Primary | ICD-10-CM

## 2024-10-07 DIAGNOSIS — I25.10 CORONARY ARTERY DISEASE INVOLVING NATIVE CORONARY ARTERY OF NATIVE HEART WITHOUT ANGINA PECTORIS: ICD-10-CM

## 2024-10-07 DIAGNOSIS — L12.0 BULLOUS PEMPHIGOID (MULTI): ICD-10-CM

## 2024-10-07 DIAGNOSIS — F03.B0 MODERATE DEMENTIA WITHOUT BEHAVIORAL DISTURBANCE, PSYCHOTIC DISTURBANCE, MOOD DISTURBANCE, OR ANXIETY, UNSPECIFIED DEMENTIA TYPE: ICD-10-CM

## 2024-10-07 PROCEDURE — 99308 SBSQ NF CARE LOW MDM 20: CPT | Performed by: FAMILY MEDICINE

## 2024-10-07 ASSESSMENT — ENCOUNTER SYMPTOMS
DIARRHEA: 0
DYSURIA: 0
FEVER: 0
ABDOMINAL PAIN: 0
CHILLS: 0
VOMITING: 0
COUGH: 0
ARTHRALGIAS: 1
CONSTIPATION: 0
DYSPHORIC MOOD: 1
SHORTNESS OF BREATH: 0
NAUSEA: 0
FATIGUE: 0
SORE THROAT: 0
HEADACHES: 0

## 2024-10-07 NOTE — LETTER
Patient: Yamel Zacarias  : 1940    Encounter Date: 10/07/2024    Subjective  Patient ID: Yamel Zacarias is a 84 y.o. female who is long term resident being seen and evaluated for multiple medical problems.    HPI patient being seen for monthly visit.  She had labs in September that showed Na 148, wbc 13, hgb 13. She has no new complaints or concerns. Staff has no new concerns.     Review of Systems   Constitutional:  Negative for chills, fatigue and fever.   HENT:  Negative for congestion and sore throat.    Eyes:  Negative for visual disturbance.   Respiratory:  Negative for cough and shortness of breath.    Cardiovascular:  Negative for chest pain.   Gastrointestinal:  Negative for abdominal pain, constipation, diarrhea, nausea and vomiting.   Genitourinary:  Negative for dysuria.   Musculoskeletal:  Positive for arthralgias and gait problem.   Neurological:  Negative for headaches.   Psychiatric/Behavioral:  Positive for dysphoric mood.        Objective  There were no vitals taken for this visit.    Physical Exam  Vitals reviewed: 142/76 97 65 18 wt 204-down 4 lb.   Constitutional:       General: She is not in acute distress.     Appearance: Normal appearance.   HENT:      Head: Normocephalic.      Mouth/Throat:      Mouth: Mucous membranes are moist.      Pharynx: Oropharynx is clear.   Eyes:      Extraocular Movements: Extraocular movements intact.      Pupils: Pupils are equal, round, and reactive to light.   Cardiovascular:      Rate and Rhythm: Normal rate and regular rhythm.      Heart sounds: Normal heart sounds.   Pulmonary:      Effort: Pulmonary effort is normal.      Breath sounds: Normal breath sounds. No wheezing or rhonchi.   Abdominal:      General: There is no distension.      Palpations: Abdomen is soft.      Tenderness: There is no abdominal tenderness. There is no guarding or rebound.   Musculoskeletal:      Right lower leg: No edema.      Left lower leg: No edema.    Lymphadenopathy:      Cervical: No cervical adenopathy.   Skin:     Coloration: Skin is not jaundiced.   Neurological:      Mental Status: She is alert. Mental status is at baseline.      Cranial Nerves: No cranial nerve deficit.      Gait: Gait abnormal.   Psychiatric:         Mood and Affect: Mood normal.         Assessment/Plan  Problem List Items Addressed This Visit    None    Plt now normal  Hgb stable along with wbc with chronic pred use  Monitor sodium levels  Lipids controlled  Labs being done q 6 month     Goals    None           Electronically Signed By: Hyun Drake MD   10/7/24 11:43 PM

## 2024-10-08 NOTE — PROGRESS NOTES
Subjective   Patient ID: Yamel Zacarias is a 84 y.o. female who is long term resident being seen and evaluated for multiple medical problems.    HPI patient being seen for monthly visit.  She had labs in September that showed Na 148, wbc 13, hgb 13. She has no new complaints or concerns. Staff has no new concerns.     Review of Systems   Constitutional:  Negative for chills, fatigue and fever.   HENT:  Negative for congestion and sore throat.    Eyes:  Negative for visual disturbance.   Respiratory:  Negative for cough and shortness of breath.    Cardiovascular:  Negative for chest pain.   Gastrointestinal:  Negative for abdominal pain, constipation, diarrhea, nausea and vomiting.   Genitourinary:  Negative for dysuria.   Musculoskeletal:  Positive for arthralgias and gait problem.   Neurological:  Negative for headaches.   Psychiatric/Behavioral:  Positive for dysphoric mood.        Objective   There were no vitals taken for this visit.    Physical Exam  Vitals reviewed: 142/76 97 65 18 wt 204-down 4 lb.   Constitutional:       General: She is not in acute distress.     Appearance: Normal appearance.   HENT:      Head: Normocephalic.      Mouth/Throat:      Mouth: Mucous membranes are moist.      Pharynx: Oropharynx is clear.   Eyes:      Extraocular Movements: Extraocular movements intact.      Pupils: Pupils are equal, round, and reactive to light.   Cardiovascular:      Rate and Rhythm: Normal rate and regular rhythm.      Heart sounds: Normal heart sounds.   Pulmonary:      Effort: Pulmonary effort is normal.      Breath sounds: Normal breath sounds. No wheezing or rhonchi.   Abdominal:      General: There is no distension.      Palpations: Abdomen is soft.      Tenderness: There is no abdominal tenderness. There is no guarding or rebound.   Musculoskeletal:      Right lower leg: No edema.      Left lower leg: No edema.   Lymphadenopathy:      Cervical: No cervical adenopathy.   Skin:     Coloration: Skin is  not jaundiced.   Neurological:      Mental Status: She is alert. Mental status is at baseline.      Cranial Nerves: No cranial nerve deficit.      Gait: Gait abnormal.   Psychiatric:         Mood and Affect: Mood normal.         Assessment/Plan   Problem List Items Addressed This Visit    None    Plt now normal  Hgb stable along with wbc with chronic pred use  Monitor sodium levels  Lipids controlled  Labs being done q 6 month     Goals    None

## 2024-10-16 ENCOUNTER — NURSING HOME VISIT (OUTPATIENT)
Dept: POST ACUTE CARE | Facility: EXTERNAL LOCATION | Age: 84
End: 2024-10-16
Payer: COMMERCIAL

## 2024-10-16 VITALS
BODY MASS INDEX: 31.95 KG/M2 | RESPIRATION RATE: 16 BRPM | OXYGEN SATURATION: 95 % | SYSTOLIC BLOOD PRESSURE: 142 MMHG | WEIGHT: 204 LBS | HEART RATE: 65 BPM | DIASTOLIC BLOOD PRESSURE: 76 MMHG | TEMPERATURE: 97.8 F

## 2024-10-16 DIAGNOSIS — I10 PRIMARY HYPERTENSION: ICD-10-CM

## 2024-10-16 DIAGNOSIS — R13.12 OROPHARYNGEAL DYSPHAGIA: Primary | ICD-10-CM

## 2024-10-16 DIAGNOSIS — R63.4 WEIGHT LOSS: ICD-10-CM

## 2024-10-16 PROCEDURE — 99309 SBSQ NF CARE MODERATE MDM 30: CPT | Performed by: NURSE PRACTITIONER

## 2024-10-16 ASSESSMENT — ENCOUNTER SYMPTOMS: WEAKNESS: 1

## 2024-10-16 NOTE — PROGRESS NOTES
Subjective   Patient ID: Yamel Zacarias is a 84 y.o. female who presents for Weight Loss.    Following up with patient who has had some slow progressive weight loss. She takes lasix 20mg daily and continues to eat well. She is on a dysphagia diet with pureed food and thickened liquids. She has no edema in her legs and does not need to wear SUDHAKAR hose any longer. She takes losartan for HTN and her VS have been stable. She had labs in September which were reviewed. She has no complaints today and states that she feels well         Review of Systems   Neurological:  Positive for weakness.   All other systems reviewed and are negative.      Objective   /76   Pulse 65   Temp 36.6 °C (97.8 °F)   Resp 16   Wt 92.5 kg (204 lb)   SpO2 95%   BMI 31.95 kg/m²     Physical Exam  Constitutional:       General: She is not in acute distress.     Appearance: She is not ill-appearing.   HENT:      Mouth/Throat:      Mouth: Mucous membranes are moist.   Eyes:      Conjunctiva/sclera: Conjunctivae normal.   Cardiovascular:      Rate and Rhythm: Normal rate and regular rhythm.   Pulmonary:      Effort: Pulmonary effort is normal.      Breath sounds: Normal breath sounds.   Abdominal:      General: Bowel sounds are normal.   Musculoskeletal:      Cervical back: Neck supple.      Comments: Seen up in , requires art lift for transfers   Skin:     General: Skin is warm and dry.   Neurological:      Mental Status: She is alert. Mental status is at baseline.   Psychiatric:         Mood and Affect: Mood normal.         Assessment/Plan   Diagnoses and all orders for this visit:  Oropharyngeal dysphagia  Comments:  chronic/stable: cont with current diet  Weight loss  Comments:  decrease lasix to QOD,monitor labs, weight  Primary hypertension  Comments:  chronic/stable: cont with losartan

## 2024-10-16 NOTE — LETTER
Patient: Yamel Zacarias  : 1940    Encounter Date: 10/16/2024    Subjective  Patient ID: Yamel Zacarias is a 84 y.o. female who presents for Weight Loss.    Following up with patient who has had some slow progressive weight loss. She takes lasix 20mg daily and continues to eat well. She is on a dysphagia diet with pureed food and thickened liquids. She has no edema in her legs and does not need to wear SUDHAKAR hose any longer. She takes losartan for HTN and her VS have been stable. She had labs in September which were reviewed. She has no complaints today and states that she feels well         Review of Systems   Neurological:  Positive for weakness.   All other systems reviewed and are negative.      Objective  /76   Pulse 65   Temp 36.6 °C (97.8 °F)   Resp 16   Wt 92.5 kg (204 lb)   SpO2 95%   BMI 31.95 kg/m²     Physical Exam  Constitutional:       General: She is not in acute distress.     Appearance: She is not ill-appearing.   HENT:      Mouth/Throat:      Mouth: Mucous membranes are moist.   Eyes:      Conjunctiva/sclera: Conjunctivae normal.   Cardiovascular:      Rate and Rhythm: Normal rate and regular rhythm.   Pulmonary:      Effort: Pulmonary effort is normal.      Breath sounds: Normal breath sounds.   Abdominal:      General: Bowel sounds are normal.   Musculoskeletal:      Cervical back: Neck supple.      Comments: Seen up in , requires art lift for transfers   Skin:     General: Skin is warm and dry.   Neurological:      Mental Status: She is alert. Mental status is at baseline.   Psychiatric:         Mood and Affect: Mood normal.         Assessment/Plan  Diagnoses and all orders for this visit:  Oropharyngeal dysphagia  Comments:  chronic/stable: cont with current diet  Weight loss  Comments:  decrease lasix to QOD,monitor labs, weight  Primary hypertension  Comments:  chronic/stable: cont with losartan           Electronically Signed By: SKY Boyd-CNP   10/16/24   2:06 PM

## 2024-11-04 ENCOUNTER — NURSING HOME VISIT (OUTPATIENT)
Dept: POST ACUTE CARE | Facility: EXTERNAL LOCATION | Age: 84
End: 2024-11-04
Payer: COMMERCIAL

## 2024-11-04 DIAGNOSIS — I25.10 CORONARY ARTERY DISEASE INVOLVING NATIVE CORONARY ARTERY OF NATIVE HEART WITHOUT ANGINA PECTORIS: ICD-10-CM

## 2024-11-04 DIAGNOSIS — L12.0 BULLOUS PEMPHIGOID (MULTI): ICD-10-CM

## 2024-11-04 DIAGNOSIS — D72.825 BANDEMIA: ICD-10-CM

## 2024-11-04 DIAGNOSIS — I10 PRIMARY HYPERTENSION: Primary | ICD-10-CM

## 2024-11-04 PROCEDURE — 99308 SBSQ NF CARE LOW MDM 20: CPT | Performed by: FAMILY MEDICINE

## 2024-11-04 NOTE — LETTER
Patient: Yamel Zacarias  : 1940    Encounter Date: 2024    Subjective  Patient ID: Yamel Zacarias is a 84 y.o. female who is long term resident being seen and evaluated for multiple medical problems.    HPI patient being seen for monthly visit.  She had labs in September that showed Na 148, wbc 13, hgb 13. She has no new complaints or concerns. Staff has no new concerns.  No worsening of rash noted.  Plan to recheck labs in March.    Review of Systems   Constitutional:  Negative for chills, fatigue and fever.   HENT:  Negative for congestion and sore throat.    Eyes:  Negative for visual disturbance.   Respiratory:  Negative for cough and shortness of breath.    Cardiovascular:  Negative for chest pain.   Gastrointestinal:  Negative for abdominal pain, constipation, diarrhea, nausea and vomiting.   Genitourinary:  Negative for dysuria.   Musculoskeletal:  Positive for arthralgias and gait problem.   Neurological:  Negative for headaches.   Psychiatric/Behavioral:  Positive for dysphoric mood.        Objective  There were no vitals taken for this visit.    Physical Exam  Vitals reviewed: 132/60 temp 97.3 pulse 62 weight 204 in October with recheck for November pending.   Constitutional:       General: She is not in acute distress.     Appearance: Normal appearance.   HENT:      Head: Normocephalic.      Mouth/Throat:      Mouth: Mucous membranes are moist.      Pharynx: Oropharynx is clear.   Eyes:      Extraocular Movements: Extraocular movements intact.      Pupils: Pupils are equal, round, and reactive to light.   Cardiovascular:      Rate and Rhythm: Normal rate and regular rhythm.      Heart sounds: Normal heart sounds.   Pulmonary:      Effort: Pulmonary effort is normal.      Breath sounds: Normal breath sounds. No wheezing or rhonchi.   Abdominal:      General: There is no distension.      Palpations: Abdomen is soft.      Tenderness: There is no abdominal tenderness. There is no guarding or  rebound.   Musculoskeletal:      Right lower leg: No edema.      Left lower leg: No edema.   Lymphadenopathy:      Cervical: No cervical adenopathy.   Skin:     Coloration: Skin is not jaundiced.   Neurological:      Mental Status: She is alert. Mental status is at baseline.      Cranial Nerves: No cranial nerve deficit.      Gait: Gait abnormal.   Psychiatric:         Mood and Affect: Mood normal.         Assessment/Plan  Problem List Items Addressed This Visit             ICD-10-CM    Bullous pemphigoid (Multi) L12.0    Coronary artery disease I25.10    Hypertension - Primary I10    Leukocytosis D72.829     Plt now normal  Hgb stable along with wbc with chronic pred use  Monitor sodium levels  Lipids controlled  Labs being done q 6 month-due march     Goals    None           Electronically Signed By: Hyun Drake MD   11/5/24  1:22 PM

## 2024-11-05 ASSESSMENT — ENCOUNTER SYMPTOMS
FEVER: 0
ARTHRALGIAS: 1
CONSTIPATION: 0
DYSURIA: 0
ABDOMINAL PAIN: 0
DIARRHEA: 0
FATIGUE: 0
NAUSEA: 0
VOMITING: 0
CHILLS: 0
SHORTNESS OF BREATH: 0
COUGH: 0
SORE THROAT: 0
DYSPHORIC MOOD: 1
HEADACHES: 0

## 2024-11-05 NOTE — PROGRESS NOTES
Subjective   Patient ID: Yamel Zacarias is a 84 y.o. female who is long term resident being seen and evaluated for multiple medical problems.    HPI patient being seen for monthly visit.  She had labs in September that showed Na 148, wbc 13, hgb 13. She has no new complaints or concerns. Staff has no new concerns.  No worsening of rash noted.  Plan to recheck labs in March.    Review of Systems   Constitutional:  Negative for chills, fatigue and fever.   HENT:  Negative for congestion and sore throat.    Eyes:  Negative for visual disturbance.   Respiratory:  Negative for cough and shortness of breath.    Cardiovascular:  Negative for chest pain.   Gastrointestinal:  Negative for abdominal pain, constipation, diarrhea, nausea and vomiting.   Genitourinary:  Negative for dysuria.   Musculoskeletal:  Positive for arthralgias and gait problem.   Neurological:  Negative for headaches.   Psychiatric/Behavioral:  Positive for dysphoric mood.        Objective   There were no vitals taken for this visit.    Physical Exam  Vitals reviewed: 132/60 temp 97.3 pulse 62 weight 204 in October with recheck for November pending.   Constitutional:       General: She is not in acute distress.     Appearance: Normal appearance.   HENT:      Head: Normocephalic.      Mouth/Throat:      Mouth: Mucous membranes are moist.      Pharynx: Oropharynx is clear.   Eyes:      Extraocular Movements: Extraocular movements intact.      Pupils: Pupils are equal, round, and reactive to light.   Cardiovascular:      Rate and Rhythm: Normal rate and regular rhythm.      Heart sounds: Normal heart sounds.   Pulmonary:      Effort: Pulmonary effort is normal.      Breath sounds: Normal breath sounds. No wheezing or rhonchi.   Abdominal:      General: There is no distension.      Palpations: Abdomen is soft.      Tenderness: There is no abdominal tenderness. There is no guarding or rebound.   Musculoskeletal:      Right lower leg: No edema.      Left  lower leg: No edema.   Lymphadenopathy:      Cervical: No cervical adenopathy.   Skin:     Coloration: Skin is not jaundiced.   Neurological:      Mental Status: She is alert. Mental status is at baseline.      Cranial Nerves: No cranial nerve deficit.      Gait: Gait abnormal.   Psychiatric:         Mood and Affect: Mood normal.         Assessment/Plan   Problem List Items Addressed This Visit             ICD-10-CM    Bullous pemphigoid (Multi) L12.0    Coronary artery disease I25.10    Hypertension - Primary I10    Leukocytosis D72.829     Plt now normal  Hgb stable along with wbc with chronic pred use  Monitor sodium levels  Lipids controlled  Labs being done q 6 month-due march     Goals    None

## 2024-11-27 ENCOUNTER — NURSING HOME VISIT (OUTPATIENT)
Dept: POST ACUTE CARE | Facility: EXTERNAL LOCATION | Age: 84
End: 2024-11-27
Payer: COMMERCIAL

## 2024-11-27 DIAGNOSIS — R13.12 OROPHARYNGEAL DYSPHAGIA: Primary | ICD-10-CM

## 2024-11-27 DIAGNOSIS — R34 LOW URINE OUTPUT: ICD-10-CM

## 2024-11-27 DIAGNOSIS — L30.9 DERMATITIS: ICD-10-CM

## 2024-11-27 PROCEDURE — 99309 SBSQ NF CARE MODERATE MDM 30: CPT | Performed by: NURSE PRACTITIONER

## 2024-11-27 NOTE — LETTER
Patient: Yamel Zacarias  : 1940    Encounter Date: 2024    Subjective  Patient ID: Yamel Zacarias is a 84 y.o. female who presents for Nausea.    Asked to see patient who has had emesis 2 days in a row with breakfast. Staff and other residents state that she has been eating very quickly. She has been encouraged to eat slower. She is on a pureed diet with nectar thick liquids. She denies stomach upset.  Later in the day staff notified me that patient had no urine output for the day. Her brief had been dry. Straight cath performed for less than 200cc. Urine was dipped and positive for signs of UTI. This will be sent for culture. Staff also states that patient has a red weeping rash on her abdomen. Patient denies that it hurts or burns. She does have a pruritic rash on her extremities. She was treated with permetherin cream but continues to have a chronic rash         Review of Systems   HENT:  Positive for trouble swallowing.    Gastrointestinal:  Positive for vomiting.   Genitourinary:  Positive for decreased urine volume.   Musculoskeletal:  Positive for gait problem.   Skin:  Positive for rash.       Objective  /63   Pulse 56   Temp 36.3 °C (97.3 °F)   Resp 16   Wt 92.7 kg (204 lb 6.4 oz)   SpO2 94%   BMI 32.01 kg/m²     Physical Exam  Constitutional:       General: She is not in acute distress.     Appearance: She is obese. She is not ill-appearing.   HENT:      Mouth/Throat:      Mouth: Mucous membranes are moist.   Eyes:      Conjunctiva/sclera: Conjunctivae normal.   Cardiovascular:      Rate and Rhythm: Normal rate and regular rhythm.   Pulmonary:      Effort: Pulmonary effort is normal.      Breath sounds: Examination of the right-upper field reveals decreased breath sounds. Examination of the left-upper field reveals decreased breath sounds. Decreased breath sounds present.   Abdominal:      General: Bowel sounds are normal. There is no distension.      Tenderness: There is no  abdominal tenderness.   Musculoskeletal:      Comments: Jermaine lift to WC, propels self   Skin:     General: Skin is warm and dry.   Neurological:      Mental Status: She is alert. Mental status is at baseline.   Psychiatric:         Mood and Affect: Mood normal.         Assessment/Plan  Diagnoses and all orders for this visit:  Oropharyngeal dysphagia  Comments:  staff to encourage patient to eat slower, ST eval  Dermatitis  Comments:  clotrimazole powder to abdominal folds, triamcinolone cream to pruritic areas BID  Low urine output  Comments:  UA to be sent           Electronically Signed By: MELECIO Boyd   11/29/24  9:57 AM

## 2024-11-29 VITALS
TEMPERATURE: 97.3 F | RESPIRATION RATE: 16 BRPM | SYSTOLIC BLOOD PRESSURE: 152 MMHG | WEIGHT: 204.4 LBS | OXYGEN SATURATION: 94 % | DIASTOLIC BLOOD PRESSURE: 63 MMHG | BODY MASS INDEX: 32.01 KG/M2 | HEART RATE: 56 BPM

## 2024-11-29 ASSESSMENT — ENCOUNTER SYMPTOMS
VOMITING: 1
TROUBLE SWALLOWING: 1

## 2024-11-29 NOTE — PROGRESS NOTES
Subjective   Patient ID: Yamel Zacarias is a 84 y.o. female who presents for Nausea.    Asked to see patient who has had emesis 2 days in a row with breakfast. Staff and other residents state that she has been eating very quickly. She has been encouraged to eat slower. She is on a pureed diet with nectar thick liquids. She denies stomach upset.  Later in the day staff notified me that patient had no urine output for the day. Her brief had been dry. Straight cath performed for less than 200cc. Urine was dipped and positive for signs of UTI. This will be sent for culture. Staff also states that patient has a red weeping rash on her abdomen. Patient denies that it hurts or burns. She does have a pruritic rash on her extremities. She was treated with permetherin cream but continues to have a chronic rash         Review of Systems   HENT:  Positive for trouble swallowing.    Gastrointestinal:  Positive for vomiting.   Genitourinary:  Positive for decreased urine volume.   Musculoskeletal:  Positive for gait problem.   Skin:  Positive for rash.       Objective   /63   Pulse 56   Temp 36.3 °C (97.3 °F)   Resp 16   Wt 92.7 kg (204 lb 6.4 oz)   SpO2 94%   BMI 32.01 kg/m²     Physical Exam  Constitutional:       General: She is not in acute distress.     Appearance: She is obese. She is not ill-appearing.   HENT:      Mouth/Throat:      Mouth: Mucous membranes are moist.   Eyes:      Conjunctiva/sclera: Conjunctivae normal.   Cardiovascular:      Rate and Rhythm: Normal rate and regular rhythm.   Pulmonary:      Effort: Pulmonary effort is normal.      Breath sounds: Examination of the right-upper field reveals decreased breath sounds. Examination of the left-upper field reveals decreased breath sounds. Decreased breath sounds present.   Abdominal:      General: Bowel sounds are normal. There is no distension.      Tenderness: There is no abdominal tenderness.   Musculoskeletal:      Comments: Jermaine lift to ,  propels self   Skin:     General: Skin is warm and dry.   Neurological:      Mental Status: She is alert. Mental status is at baseline.   Psychiatric:         Mood and Affect: Mood normal.         Assessment/Plan   Diagnoses and all orders for this visit:  Oropharyngeal dysphagia  Comments:  staff to encourage patient to eat slower, ST eval  Dermatitis  Comments:  clotrimazole powder to abdominal folds, triamcinolone cream to pruritic areas BID  Low urine output  Comments:  UA to be sent

## 2024-12-02 ENCOUNTER — NURSING HOME VISIT (OUTPATIENT)
Dept: POST ACUTE CARE | Facility: EXTERNAL LOCATION | Age: 84
End: 2024-12-02
Payer: COMMERCIAL

## 2024-12-02 DIAGNOSIS — N17.9 ACUTE RENAL FAILURE, UNSPECIFIED ACUTE RENAL FAILURE TYPE (CMS-HCC): ICD-10-CM

## 2024-12-02 DIAGNOSIS — N30.01 ACUTE CYSTITIS WITH HEMATURIA: Primary | ICD-10-CM

## 2024-12-02 DIAGNOSIS — D72.825 BANDEMIA: ICD-10-CM

## 2024-12-02 DIAGNOSIS — E87.0 HYPERNATREMIA: ICD-10-CM

## 2024-12-02 PROCEDURE — 99309 SBSQ NF CARE MODERATE MDM 30: CPT | Performed by: FAMILY MEDICINE

## 2024-12-02 NOTE — LETTER
Patient: Yamel Zacarias  : 1940    Encounter Date: 2024    Subjective  Patient ID: Yamel Zacarias is a 84 y.o. female who is long term resident being seen and evaluated for multiple medical problems.    HPI patient being seen for monthly visit.  She had labs in September that showed Na 148, wbc 13, hgb 13. She had vomiting last month and urine ordered due to no urine output also noted. Proteus grew and now on macrobid. Gfr 48 with na 150 from 148.     Review of Systems   Constitutional:  Negative for chills, fatigue and fever.   HENT:  Negative for congestion and sore throat.    Eyes:  Negative for visual disturbance.   Respiratory:  Negative for cough and shortness of breath.    Cardiovascular:  Negative for chest pain.   Gastrointestinal:  Negative for abdominal pain, constipation, diarrhea, nausea and vomiting.   Genitourinary:  Negative for dysuria.   Musculoskeletal:  Positive for arthralgias and gait problem.   Neurological:  Negative for headaches.   Psychiatric/Behavioral:  Positive for dysphoric mood.        Objective  There were no vitals taken for this visit.    Physical Exam  Vitals reviewed: 135/63 97.3 56 wt 204.   Constitutional:       General: She is not in acute distress.     Appearance: Normal appearance.   HENT:      Head: Normocephalic.      Mouth/Throat:      Mouth: Mucous membranes are moist.      Pharynx: Oropharynx is clear.   Eyes:      Extraocular Movements: Extraocular movements intact.      Pupils: Pupils are equal, round, and reactive to light.   Cardiovascular:      Rate and Rhythm: Normal rate and regular rhythm.      Heart sounds: Normal heart sounds.   Pulmonary:      Effort: Pulmonary effort is normal.      Breath sounds: Normal breath sounds. No wheezing or rhonchi.   Abdominal:      General: There is no distension.      Palpations: Abdomen is soft.      Tenderness: There is no abdominal tenderness. There is no guarding or rebound.   Musculoskeletal:      Right  lower leg: No edema.      Left lower leg: No edema.   Lymphadenopathy:      Cervical: No cervical adenopathy.   Skin:     Coloration: Skin is not jaundiced.   Neurological:      Mental Status: She is alert. Mental status is at baseline.      Cranial Nerves: No cranial nerve deficit.      Gait: Gait abnormal.   Psychiatric:         Mood and Affect: Mood normal.         Assessment/Plan  Problem List Items Addressed This Visit             ICD-10-CM    Leukocytosis D72.829     Other Visit Diagnoses         Codes    Acute cystitis with hematuria    -  Primary N30.01    Acute renal failure, unspecified acute renal failure type (CMS-HCC)     N17.9    Hypernatremia     E87.0          Finish macrobid  Recheck renal function after completing treatment to make sure gfr improves as well as sodium level     Goals    None           Electronically Signed By: Hyun Drake MD   12/3/24 12:54 PM

## 2024-12-03 ENCOUNTER — NURSING HOME VISIT (OUTPATIENT)
Dept: POST ACUTE CARE | Facility: EXTERNAL LOCATION | Age: 84
End: 2024-12-03
Payer: COMMERCIAL

## 2024-12-03 DIAGNOSIS — L30.9 DERMATITIS: ICD-10-CM

## 2024-12-03 DIAGNOSIS — E87.0 HYPERNATREMIA: Primary | ICD-10-CM

## 2024-12-03 DIAGNOSIS — N30.00 ACUTE CYSTITIS WITHOUT HEMATURIA: ICD-10-CM

## 2024-12-03 PROCEDURE — 99309 SBSQ NF CARE MODERATE MDM 30: CPT | Performed by: NURSE PRACTITIONER

## 2024-12-03 ASSESSMENT — ENCOUNTER SYMPTOMS
DYSPHORIC MOOD: 1
NAUSEA: 0
SHORTNESS OF BREATH: 0
FEVER: 0
COUGH: 0
ABDOMINAL PAIN: 0
FATIGUE: 0
DYSURIA: 0
HEADACHES: 0
DIARRHEA: 0
SORE THROAT: 0
ARTHRALGIAS: 1
CONSTIPATION: 0
VOMITING: 0
CHILLS: 0

## 2024-12-03 NOTE — LETTER
Patient: Yamel Zacarias  : 1940    Encounter Date: 2024    Subjective  Patient ID: Yamel Zacarias is a 84 y.o. female who presents for Abnormal Sodium.    Following up with patient who had labs yesterday with an elevated sodium level. She states that she does not think she has been drinking enough. She is on nectar thick liquids and has been drinking very quickly which has been leading her vomiting. She denies feeling dizzy, lightheaded or fatigued.     Patient received prophylactic treatment of permetherin for dermatitis. She states that she can;t tell if it got better after treatment or not. She has not had any other treatments since she had permetherin treatment. She has had a long standing pruritic rash which was being treated with steroid cream.     Patient continues on macrobid for treatment of UTI. She had a cortez placed for urinary retention.          Review of Systems   HENT:  Positive for trouble swallowing.    Genitourinary:  Positive for difficulty urinating.   Skin:  Positive for rash.       Objective  /63   Pulse 56   Temp 36.3 °C (97.3 °F)   Resp 18   Wt 93.9 kg (207 lb)   SpO2 94%   BMI 32.42 kg/m²     Physical Exam  Constitutional:       General: She is not in acute distress.     Appearance: She is obese. She is not ill-appearing.   HENT:      Mouth/Throat:      Mouth: Mucous membranes are moist.   Eyes:      Conjunctiva/sclera: Conjunctivae normal.   Cardiovascular:      Rate and Rhythm: Normal rate and regular rhythm.   Pulmonary:      Effort: Pulmonary effort is normal.      Breath sounds: Normal breath sounds.   Abdominal:      General: Bowel sounds are normal.   Musculoskeletal:      Comments: Jermaine lift for transfers   Skin:     General: Skin is warm and dry.      Findings: Rash present.   Neurological:      Mental Status: She is alert. Mental status is at baseline.   Psychiatric:         Mood and Affect: Mood normal.         Assessment/Plan  Diagnoses and all  orders for this visit:  Hypernatremia  Comments:  1L NS via clysis system, repeat labs on 12/4/24, encourage fluids  Dermatitis  Comments:  repeat permetherin cream for one additional application  Acute cystitis without hematuria  Comments:  cont with macrobid, trial void in AM           Electronically Signed By: SKY Boyd-CNP   12/5/24 10:05 AM

## 2024-12-03 NOTE — PROGRESS NOTES
Subjective   Patient ID: Yamel Zacarias is a 84 y.o. female who is long term resident being seen and evaluated for multiple medical problems.    HPI patient being seen for monthly visit.  She had labs in September that showed Na 148, wbc 13, hgb 13. She had vomiting last month and urine ordered due to no urine output also noted. Proteus grew and now on macrobid. Gfr 48 with na 150 from 148.     Review of Systems   Constitutional:  Negative for chills, fatigue and fever.   HENT:  Negative for congestion and sore throat.    Eyes:  Negative for visual disturbance.   Respiratory:  Negative for cough and shortness of breath.    Cardiovascular:  Negative for chest pain.   Gastrointestinal:  Negative for abdominal pain, constipation, diarrhea, nausea and vomiting.   Genitourinary:  Negative for dysuria.   Musculoskeletal:  Positive for arthralgias and gait problem.   Neurological:  Negative for headaches.   Psychiatric/Behavioral:  Positive for dysphoric mood.        Objective   There were no vitals taken for this visit.    Physical Exam  Vitals reviewed: 135/63 97.3 56 wt 204.   Constitutional:       General: She is not in acute distress.     Appearance: Normal appearance.   HENT:      Head: Normocephalic.      Mouth/Throat:      Mouth: Mucous membranes are moist.      Pharynx: Oropharynx is clear.   Eyes:      Extraocular Movements: Extraocular movements intact.      Pupils: Pupils are equal, round, and reactive to light.   Cardiovascular:      Rate and Rhythm: Normal rate and regular rhythm.      Heart sounds: Normal heart sounds.   Pulmonary:      Effort: Pulmonary effort is normal.      Breath sounds: Normal breath sounds. No wheezing or rhonchi.   Abdominal:      General: There is no distension.      Palpations: Abdomen is soft.      Tenderness: There is no abdominal tenderness. There is no guarding or rebound.   Musculoskeletal:      Right lower leg: No edema.      Left lower leg: No edema.   Lymphadenopathy:       Cervical: No cervical adenopathy.   Skin:     Coloration: Skin is not jaundiced.   Neurological:      Mental Status: She is alert. Mental status is at baseline.      Cranial Nerves: No cranial nerve deficit.      Gait: Gait abnormal.   Psychiatric:         Mood and Affect: Mood normal.         Assessment/Plan   Problem List Items Addressed This Visit             ICD-10-CM    Leukocytosis D72.829     Other Visit Diagnoses         Codes    Acute cystitis with hematuria    -  Primary N30.01    Acute renal failure, unspecified acute renal failure type (CMS-HCC)     N17.9    Hypernatremia     E87.0          Finish macrobid  Recheck renal function after completing treatment to make sure gfr improves as well as sodium level     Goals    None

## 2024-12-05 VITALS
SYSTOLIC BLOOD PRESSURE: 152 MMHG | TEMPERATURE: 97.3 F | HEART RATE: 56 BPM | BODY MASS INDEX: 32.42 KG/M2 | DIASTOLIC BLOOD PRESSURE: 63 MMHG | WEIGHT: 207 LBS | RESPIRATION RATE: 18 BRPM | OXYGEN SATURATION: 94 %

## 2024-12-05 ASSESSMENT — ENCOUNTER SYMPTOMS
DIFFICULTY URINATING: 1
TROUBLE SWALLOWING: 1

## 2024-12-05 NOTE — PROGRESS NOTES
Subjective   Patient ID: Yamel Zacarias is a 84 y.o. female who presents for Abnormal Sodium.    Following up with patient who had labs yesterday with an elevated sodium level. She states that she does not think she has been drinking enough. She is on nectar thick liquids and has been drinking very quickly which has been leading her vomiting. She denies feeling dizzy, lightheaded or fatigued.     Patient received prophylactic treatment of permetherin for dermatitis. She states that she can;t tell if it got better after treatment or not. She has not had any other treatments since she had permetherin treatment. She has had a long standing pruritic rash which was being treated with steroid cream.     Patient continues on macrobid for treatment of UTI. She had a cortez placed for urinary retention.          Review of Systems   HENT:  Positive for trouble swallowing.    Genitourinary:  Positive for difficulty urinating.   Skin:  Positive for rash.       Objective   /63   Pulse 56   Temp 36.3 °C (97.3 °F)   Resp 18   Wt 93.9 kg (207 lb)   SpO2 94%   BMI 32.42 kg/m²     Physical Exam  Constitutional:       General: She is not in acute distress.     Appearance: She is obese. She is not ill-appearing.   HENT:      Mouth/Throat:      Mouth: Mucous membranes are moist.   Eyes:      Conjunctiva/sclera: Conjunctivae normal.   Cardiovascular:      Rate and Rhythm: Normal rate and regular rhythm.   Pulmonary:      Effort: Pulmonary effort is normal.      Breath sounds: Normal breath sounds.   Abdominal:      General: Bowel sounds are normal.   Musculoskeletal:      Comments: Jermaine lift for transfers   Skin:     General: Skin is warm and dry.      Findings: Rash present.   Neurological:      Mental Status: She is alert. Mental status is at baseline.   Psychiatric:         Mood and Affect: Mood normal.         Assessment/Plan   Diagnoses and all orders for this visit:  Hypernatremia  Comments:  1L NS via clysis system,  repeat labs on 12/4/24, encourage fluids  Dermatitis  Comments:  repeat permetherin cream for one additional application  Acute cystitis without hematuria  Comments:  cont with macrobid, trial void in AM

## 2024-12-17 ENCOUNTER — NURSING HOME VISIT (OUTPATIENT)
Dept: POST ACUTE CARE | Facility: EXTERNAL LOCATION | Age: 84
End: 2024-12-17
Payer: COMMERCIAL

## 2024-12-17 VITALS
OXYGEN SATURATION: 96 % | RESPIRATION RATE: 18 BRPM | SYSTOLIC BLOOD PRESSURE: 158 MMHG | WEIGHT: 207 LBS | HEART RATE: 87 BPM | TEMPERATURE: 97.1 F | DIASTOLIC BLOOD PRESSURE: 86 MMHG | BODY MASS INDEX: 32.42 KG/M2

## 2024-12-17 DIAGNOSIS — R13.13 PHARYNGEAL DYSPHAGIA: Primary | ICD-10-CM

## 2024-12-17 PROCEDURE — 99309 SBSQ NF CARE MODERATE MDM 30: CPT | Performed by: NURSE PRACTITIONER

## 2024-12-17 ASSESSMENT — ENCOUNTER SYMPTOMS
COUGH: 1
TROUBLE SWALLOWING: 1
WEAKNESS: 1

## 2024-12-17 NOTE — PROGRESS NOTES
Subjective   Patient ID: Yamel Zacarias is a 84 y.o. female who presents for Dysphagia.    Asked to see patient who has been having difficulty with kdysphagia. Staff states that she has been refusing to eat at meals and has been resistent to being in the higher needs dining room. She has been coughing on liquids more to the point that she vomits. Staff states that it is difficult to give her fluids because of this. She has been more resistant to ST exercises. Her last labs had an elevated NA level and she received IVF for this.          Review of Systems   HENT:  Positive for trouble swallowing.    Respiratory:  Positive for cough.    Neurological:  Positive for weakness.   Psychiatric/Behavioral:  Positive for behavioral problems.        Objective   /86   Pulse 87   Temp 36.2 °C (97.1 °F)   Resp 18   Wt 93.9 kg (207 lb)   SpO2 96%   BMI 32.42 kg/m²     Physical Exam  Constitutional:       General: She is not in acute distress.     Appearance: She is obese. She is not ill-appearing.   HENT:      Mouth/Throat:      Mouth: Mucous membranes are moist.   Eyes:      Conjunctiva/sclera: Conjunctivae normal.   Pulmonary:      Effort: Pulmonary effort is normal.      Comments: Moist cough  Abdominal:      General: Bowel sounds are normal.   Musculoskeletal:      Comments: Able to propel self n WC   Skin:     General: Skin is warm and dry.   Neurological:      Mental Status: She is alert.   Psychiatric:         Mood and Affect: Affect is angry.         Assessment/Plan   Diagnoses and all orders for this visit:  Pharyngeal dysphagia  Comments:  CXR, CBC, BMP 12/18/24  ST to continue to follow, can trial honey thick liquids but may ultimately need PEG tube versus hospice for dysphagia related to CP

## 2024-12-17 NOTE — LETTER
Patient: Yamel Zacarias  : 1940    Encounter Date: 2024    Subjective  Patient ID: Yamel Zacarias is a 84 y.o. female who presents for Dysphagia.    Asked to see patient who has been having difficulty with kdysphagia. Staff states that she has been refusing to eat at meals and has been resistent to being in the higher needs dining room. She has been coughing on liquids more to the point that she vomits. Staff states that it is difficult to give her fluids because of this. She has been more resistant to ST exercises. Her last labs had an elevated NA level and she received IVF for this.          Review of Systems   HENT:  Positive for trouble swallowing.    Respiratory:  Positive for cough.    Neurological:  Positive for weakness.   Psychiatric/Behavioral:  Positive for behavioral problems.        Objective  /86   Pulse 87   Temp 36.2 °C (97.1 °F)   Resp 18   Wt 93.9 kg (207 lb)   SpO2 96%   BMI 32.42 kg/m²     Physical Exam  Constitutional:       General: She is not in acute distress.     Appearance: She is obese. She is not ill-appearing.   HENT:      Mouth/Throat:      Mouth: Mucous membranes are moist.   Eyes:      Conjunctiva/sclera: Conjunctivae normal.   Pulmonary:      Effort: Pulmonary effort is normal.      Comments: Moist cough  Abdominal:      General: Bowel sounds are normal.   Musculoskeletal:      Comments: Able to propel self n WC   Skin:     General: Skin is warm and dry.   Neurological:      Mental Status: She is alert.   Psychiatric:         Mood and Affect: Affect is angry.         Assessment/Plan  Diagnoses and all orders for this visit:  Pharyngeal dysphagia  Comments:  CXR, CBC, BMP 24  ST to continue to follow, can trial honey thick liquids but may ultimately need PEG tube versus hospice for dysphagia related to CP           Electronically Signed By: SKY Boyd-CNP   24  3:08 PM

## 2024-12-19 ENCOUNTER — NURSING HOME VISIT (OUTPATIENT)
Dept: POST ACUTE CARE | Facility: EXTERNAL LOCATION | Age: 84
End: 2024-12-19
Payer: COMMERCIAL

## 2024-12-19 VITALS
SYSTOLIC BLOOD PRESSURE: 158 MMHG | WEIGHT: 207 LBS | RESPIRATION RATE: 18 BRPM | DIASTOLIC BLOOD PRESSURE: 86 MMHG | BODY MASS INDEX: 32.42 KG/M2 | OXYGEN SATURATION: 97 % | TEMPERATURE: 97.1 F | HEART RATE: 74 BPM

## 2024-12-19 DIAGNOSIS — R13.12 OROPHARYNGEAL DYSPHAGIA: Primary | ICD-10-CM

## 2024-12-19 DIAGNOSIS — J40 BRONCHITIS: ICD-10-CM

## 2024-12-19 PROCEDURE — 99309 SBSQ NF CARE MODERATE MDM 30: CPT | Performed by: NURSE PRACTITIONER

## 2024-12-19 ASSESSMENT — ENCOUNTER SYMPTOMS
TROUBLE SWALLOWING: 1
FATIGUE: 1
CONFUSION: 1
COUGH: 1

## 2024-12-19 NOTE — LETTER
Patient: Yamel Zacarias  : 1940    Encounter Date: 2024    Subjective  Patient ID: Yamel Zacarias is a 84 y.o. female who presents for Cough.    Following with patient who has been having difficulty swallowing. She has not been eating either. Staff states that she has not wanted to get OOB. She has been having difficulty speaking too. Labs are pending at this time. She had a CXR which was negative but she has been having a cough. Staff spoke with guardian who is requesting a hospice consult and [facility]         Review of Systems   Constitutional:  Positive for fatigue.   HENT:  Positive for trouble swallowing.    Respiratory:  Positive for cough.    Psychiatric/Behavioral:  Positive for confusion.        Objective  /86   Pulse 74   Temp 36.2 °C (97.1 °F)   Resp 18   Wt 93.9 kg (207 lb)   SpO2 97%   BMI 32.42 kg/m²     Physical Exam  Constitutional:       General: She is not in acute distress.     Appearance: She is not ill-appearing.   HENT:      Mouth/Throat:      Mouth: Mucous membranes are moist.   Eyes:      Conjunctiva/sclera: Conjunctivae normal.   Pulmonary:      Effort: Pulmonary effort is normal.      Breath sounds: Rhonchi present.   Musculoskeletal:      Comments: Jermaine lift to WC   Skin:     General: Skin is warm and dry.   Neurological:      Mental Status: She is alert.   Psychiatric:         Mood and Affect: Affect is flat.         Assessment/Plan  Diagnoses and all orders for this visit:  Oropharyngeal dysphagia  Comments:  pureed and honey thick liquids, hospice consult [facility]  Bronchitis  Comments:  omnicef 300mg po BID for 10 days           Electronically Signed By: MELECIO Boyd   24  6:38 PM